# Patient Record
Sex: FEMALE | Race: WHITE | NOT HISPANIC OR LATINO | Employment: OTHER | URBAN - METROPOLITAN AREA
[De-identification: names, ages, dates, MRNs, and addresses within clinical notes are randomized per-mention and may not be internally consistent; named-entity substitution may affect disease eponyms.]

---

## 2017-01-26 ENCOUNTER — OFFICE VISIT (OUTPATIENT)
Dept: NEUROLOGY | Facility: CLINIC | Age: 37
End: 2017-01-26

## 2017-01-26 ENCOUNTER — HOSPITAL ENCOUNTER (OUTPATIENT)
Dept: MRI IMAGING | Facility: HOSPITAL | Age: 37
Discharge: HOME | End: 2017-01-26
Attending: PSYCHIATRY & NEUROLOGY | Admitting: PSYCHIATRY & NEUROLOGY

## 2017-01-26 VITALS
HEIGHT: 66 IN | SYSTOLIC BLOOD PRESSURE: 142 MMHG | WEIGHT: 144 LBS | BODY MASS INDEX: 23.14 KG/M2 | OXYGEN SATURATION: 98 % | DIASTOLIC BLOOD PRESSURE: 82 MMHG | HEART RATE: 102 BPM

## 2017-01-26 DIAGNOSIS — G35 MULTIPLE SCLEROSIS (HCC): Primary | ICD-10-CM

## 2017-01-26 PROCEDURE — 70553 MRI BRAIN STEM W/O & W/DYE: CPT

## 2017-01-26 PROCEDURE — A9577 INJ MULTIHANCE: HCPCS | Performed by: PSYCHIATRY & NEUROLOGY

## 2017-01-26 PROCEDURE — 0 GADOBENATE DIMEGLUMINE 529 MG/ML SOLUTION: Performed by: PSYCHIATRY & NEUROLOGY

## 2017-01-26 PROCEDURE — 99214 OFFICE O/P EST MOD 30 MIN: CPT | Performed by: PSYCHIATRY & NEUROLOGY

## 2017-01-26 RX ORDER — CYCLOBENZAPRINE HCL 10 MG
TABLET ORAL
Qty: 60 TABLET | Refills: 5 | Status: SHIPPED | OUTPATIENT
Start: 2017-01-26 | End: 2017-08-01 | Stop reason: SDUPTHER

## 2017-01-26 RX ADMIN — GADOBENATE DIMEGLUMINE 12 ML: 529 INJECTION, SOLUTION INTRAVENOUS at 12:30

## 2017-01-26 NOTE — PROGRESS NOTES
Subjective:     Patient ID: Daya Cheatham is a 36 y.o. female.    History of Present Illness     35 yo woman with RRMS returns in follow up.  Last visit on 12/30/16 ordered MRI Brain and labs.  Started in SLP, OT, and PT.    MRI Brain, my review of films, moderate T2 lesion load without enhancing lesions.  Labs - NCS  NMO - negative  JCV 1/23/17 1.2     Gait remains unsteady and decreased dexterity of hands.  Cramps and spasms in legs continue at night.  Treating with GBSTEPHANY, zanaflex,     PM:    Transferred by Dr Almaguer for RRMS.  Sx started in 2007 loss of vision in right eye for several days.  2008 left hemisensory loss for two weeks in October, then in December 2008 right sided numbness.  Dx 2009 and started on Rebif but had frequent episodes of numbness from waist down.  Swtiched to Tysabri for 6 months.  Then changed to Rebif for a year.  Back on Tysabri for 6 months.  Then back to Rebif for approx 6 years.     UTI and appendicitis in 2014 with appendectomy and since has had frequent UTI's and neurogenic bladders    Progressive decrease in gait and hand dexterity on Rebif.  Reviewed Dr Almaguer' notes:    Sx of muscle spasms, back pain, urinary retention and frequent UTI's.  Currently taking Rebif after taking Tysabri twice and stopped due to JCV positive ab.  Dx 10/2007 optic neuritis.  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    Review of Systems   Constitutional: Positive for fatigue. Negative for activity change and unexpected weight change.   HENT: Negative for tinnitus and trouble swallowing.    Eyes: Positive for visual  "disturbance. Negative for photophobia.   Respiratory: Negative for apnea and choking.    Cardiovascular: Negative for leg swelling.   Endocrine: Positive for heat intolerance. Negative for cold intolerance.   Genitourinary: Positive for difficulty urinating, frequency and urgency. Negative for menstrual problem.   Musculoskeletal: Positive for gait problem. Negative for back pain.   Skin: Negative for color change.   Allergic/Immunologic: Negative for immunocompromised state.   Neurological: Positive for tremors, weakness and numbness. Negative for dizziness, seizures, syncope, facial asymmetry, speech difficulty and light-headedness.   Hematological: Negative for adenopathy. Does not bruise/bleed easily.   Psychiatric/Behavioral: Positive for decreased concentration. Negative for behavioral problems, confusion, hallucinations and sleep disturbance.        Objective:  Vitals:    01/26/17 1330   BP: 142/82   Pulse: 102   SpO2: 98%   Weight: 144 lb (65.3 kg)   Height: 66\" (167.6 cm)       Neurologic Exam     Mental Status   Oriented to person, place, and time.   Registration: recalls 3 of 3 objects. Recall at 5 minutes: recalls 3 of 3 objects. Follows 3 step commands.   Attention: normal. Concentration: normal.   Speech: speech is normal   Level of consciousness: alert  Knowledge: good and consistent with education.   Able to name object. Able to read. Able to repeat. Able to write. Normal comprehension.     Cranial Nerves     CN II   Visual fields full to confrontation.   Visual acuity: normal  Right visual field deficit: none  Left visual field deficit: none     CN III, IV, VI   Pupils are equal, round, and reactive to light.  Extraocular motions are normal.   Right pupil: Shape: regular. Reactivity: brisk. Consensual response: intact.   Left pupil: Shape: regular. Reactivity: brisk. Consensual response: intact.   Nystagmus: none   Diplopia: none  Ophthalmoparesis: none  Upgaze: normal  Downgaze: normal  Conjugate " gaze: present  Vestibulo-ocular reflex: present    CN V   Facial sensation intact.   Right corneal reflex: normal  Left corneal reflex: normal    CN VII   Right facial weakness: none  Left facial weakness: none    CN VIII   Hearing: intact    CN IX, X   Palate: symmetric  Right gag reflex: normal  Left gag reflex: normal    CN XI   Right sternocleidomastoid strength: normal  Left sternocleidomastoid strength: normal    CN XII   Tongue: not atrophic  Fasciculations: absent  Tongue deviation: none    Motor Exam   Muscle bulk: normal  Overall muscle tone: normal  Right arm tone: increased  Left arm tone: normal  Right arm pronator drift: present  Right leg tone: normal  Left leg tone: normal    Strength   Strength 5/5 throughout.   Left iliopsoas: 4/5  Left quadriceps: 4/5  Left hamstrin/5  Left glutei: 4/5  Left anterior tibial: 4/5  Left posterior tibial: 4/5  Left peroneal: 4/5  Left gastroc: 4/5    Sensory Exam   Light touch normal.   Vibration normal.   Proprioception normal.   Pinprick normal.     Gait, Coordination, and Reflexes     Gait  Gait: circumduction and spastic (left leg)    Coordination   Romberg: negative  Finger to nose coordination: normal  Heel to shin coordination: abnormal  Tandem walking coordination: abnormal    Tremor   Resting tremor: absent  Intention tremor: absent  Action tremor: absent    Reflexes   Right brachioradialis: 3+  Left brachioradialis: 3+  Right biceps: 3+  Left biceps: 3+  Right triceps: 3+  Left triceps: 3+  Right patellar: 3+  Left patellar: 3+  Right achilles: 3+  Left achilles: 3+       SEBASTIAN decreased R > L hand       Physical Exam   Constitutional: She is oriented to person, place, and time. Vital signs are normal. She appears well-developed and well-nourished.   HENT:   Head: Normocephalic and atraumatic.   Eyes: EOM and lids are normal. Pupils are equal, round, and reactive to light.   Fundoscopic exam:       The right eye shows no exudate, no hemorrhage and no  papilledema. The right eye shows venous pulsations.        The left eye shows no exudate, no hemorrhage and no papilledema. The left eye shows venous pulsations.   Neck: Normal range of motion and phonation normal. Normal carotid pulses present. Carotid bruit is not present. No thyroid mass and no thyromegaly present.   Cardiovascular: Normal rate, regular rhythm and normal heart sounds.    Pulmonary/Chest: Effort normal.   Neurological: She is oriented to person, place, and time. She has normal strength. She has an abnormal Heel to Shin Test and an abnormal Tandem Gait Test. She has a normal Finger-Nose-Finger Test and a normal Romberg Test.   Reflex Scores:       Tricep reflexes are 3+ on the right side and 3+ on the left side.       Bicep reflexes are 3+ on the right side and 3+ on the left side.       Brachioradialis reflexes are 3+ on the right side and 3+ on the left side.       Patellar reflexes are 3+ on the right side and 3+ on the left side.       Achilles reflexes are 3+ on the right side and 3+ on the left side.  Skin: Skin is warm and dry.   Psychiatric: She has a normal mood and affect. Her speech is normal.   Nursing note and vitals reviewed.      Assessment/Plan:       Problems Addressed this Visit        Nervous and Auditory    Multiple sclerosis - Primary     Progression of disability and lesion load on Rebif  JCV 1.2    Start on Tecfidera 240 mg BID

## 2017-01-26 NOTE — MR AVS SNAPSHOT
Daya G Key   1/26/2017 2:30 PM   Office Visit    Dept Phone:  [phone]   Encounter #:  [id number]    Provider:  Stefano Panda MD   Department:  [facility] NEUROLOGY                Your Full Care Plan              Today's Medication Changes          These changes are accurate as of: 1/26/17  2:20 PM.  If you have any questions, ask your nurse or doctor.               Stop taking medication(s)listed here:     REBIF 44 MCG/0.5ML injection   Generic drug:  interferon beta-1A   Stopped by:  Stefano Panda MD                Where to Get Your Medications      These medications were sent to [address] [phone] [place] [id number] [address] [address]     Phone:  [phone]     cyclobenzaprine 10 MG tablet                  Your Updated Medication List          This list is accurate as of: 1/26/17  2:20 PM.  Always use your most recent med list.                baclofen 10 MG tablet   Commonly known as:  LIORESAL       clonazePAM 0.5 MG tablet   Commonly known as:  KlonoPIN       cyclobenzaprine 10 MG tablet   Commonly known as:  FLEXERIL   Take 1 to 2 tablets by mouth at bedtime.       gabapentin 600 MG tablet   Commonly known as:  NEURONTIN       JULEBER 0.15-30 MG-MCG per tablet   Generic drug:  desogestrel-ethinyl estradiol       tiZANidine 4 MG tablet   Commonly known as:  ZANAFLEX               You Were Diagnosed With        Codes Comments    Multiple sclerosis    -  Primary ICD-10-CM: G35  ICD-9-CM: 340       Instructions     None    Patient Instructions History      Upcoming Appointments     Visit Type Date Time Department    FOLLOW UP 1/26/2017  2:30 PM [facility]    [facility] BRAIN W WO CONTRAST 1/26/2017  1:00 PM [facility] MRI [facility]      MyChart Signup     [facility] allows you to send messages to your doctor, view your test results, renew your  "prescriptions, schedule appointments, and more. To sign up, go to [employer] and click on the Sign Up Now link in the New User? box. Enter your [employer] Activation Code exactly as it appears below along with the last four digits of your Social Security Number and your Date of Birth () to complete the sign-up process. If you do not sign up before the expiration date, you must request a new code.    [employer] Activation Code: [email or web address]  Expires: 2017  2:20 PM    If you have questions, you can email [email or web address] or call [phone] to talk to our [employer] staff. Remember, [employer] is NOT to be used for urgent needs. For medical emergencies, dial 911.               Other Info from Your Visit           Your Appointments     2017  2:30 PM EST   Follow Up with Stefano Panda MD   [facility] (--)    [address] [place] [address] [address]  [facility] [address] [phone]           Arrive 15 minutes prior to appointment.              Allergies     No Known Allergies      Reason for Visit     Multiple Sclerosis F/U on MRI      Vital Signs     Blood Pressure Pulse Height Weight Last Menstrual Period Oxygen Saturation    142/82 102 66\" (167.6 cm) 144 lb (65.3 kg) 12/15/2016 (Exact Date) 98%    Body Mass Index Smoking Status                23.24 kg/m2 Current Every Day Smoker          Problems and Diagnoses Noted     Multiple sclerosis        "

## 2017-01-26 NOTE — LETTER
January 26, 2017     Kristen Frausto MD  [address]  [facility] [address]    Patient: Daya Cheatham   YOB: 1980   Date of Visit: 1/26/2017       Dear Dr. Jett MD:    Thank you for referring Daya Cheatham to me for evaluation. Below are the relevant portions of my assessment and plan of care.         Problems Addressed this Visit        Nervous and Auditory    Multiple sclerosis - Primary     Progression of disability and lesion load on Rebif  JCV 1.2    Start on Tecfidera 240 mg BID                      If you have questions, please do not hesitate to call me. I look forward to following Daya along with you.         Sincerely,        Stefano Panda MD        CC: No Recipients

## 2017-04-03 RX ORDER — TIZANIDINE 4 MG/1
TABLET ORAL
Qty: 60 TABLET | Refills: 10 | Status: SHIPPED | OUTPATIENT
Start: 2017-04-03 | End: 2018-02-12 | Stop reason: SDUPTHER

## 2017-07-24 ENCOUNTER — TELEPHONE (OUTPATIENT)
Dept: NEUROLOGY | Facility: CLINIC | Age: 37
End: 2017-07-24

## 2017-07-24 RX ORDER — CYCLOBENZAPRINE HCL 10 MG
TABLET ORAL
Qty: 60 TABLET | Refills: 5 | OUTPATIENT
Start: 2017-07-24

## 2017-07-24 NOTE — TELEPHONE ENCOUNTER
Pharmacy called and adv that pt adv that she wanted to start getting gabapentin from Dr. Panda  Because she is no longer seeing primary drGlenroy That prescribes medicine. Adv pharm. That we was not able to fill due to pt not being seen at office since Dec. And was a no show in June. Adv that patient would need to s/u appt with  Before medicine could be filled.

## 2017-08-02 RX ORDER — CYCLOBENZAPRINE HCL 10 MG
TABLET ORAL
Qty: 60 TABLET | Refills: 5 | Status: SHIPPED | OUTPATIENT
Start: 2017-08-02 | End: 2018-02-01 | Stop reason: SDUPTHER

## 2017-09-20 ENCOUNTER — TELEPHONE (OUTPATIENT)
Dept: NEUROLOGY | Facility: CLINIC | Age: 37
End: 2017-09-20

## 2017-09-20 ENCOUNTER — LAB (OUTPATIENT)
Dept: LAB | Facility: HOSPITAL | Age: 37
End: 2017-09-20

## 2017-09-20 ENCOUNTER — OFFICE VISIT (OUTPATIENT)
Dept: NEUROLOGY | Facility: CLINIC | Age: 37
End: 2017-09-20

## 2017-09-20 VITALS
OXYGEN SATURATION: 98 % | WEIGHT: 133 LBS | HEART RATE: 97 BPM | HEIGHT: 66 IN | BODY MASS INDEX: 21.38 KG/M2 | SYSTOLIC BLOOD PRESSURE: 124 MMHG | DIASTOLIC BLOOD PRESSURE: 80 MMHG

## 2017-09-20 DIAGNOSIS — G35 MULTIPLE SCLEROSIS (HCC): Primary | ICD-10-CM

## 2017-09-20 DIAGNOSIS — G35 MULTIPLE SCLEROSIS (HCC): ICD-10-CM

## 2017-09-20 LAB
ALBUMIN SERPL-MCNC: 4.3 G/DL (ref 3.2–4.8)
ALBUMIN/GLOB SERPL: 1.4 G/DL (ref 1.5–2.5)
ALP SERPL-CCNC: 52 U/L (ref 25–100)
ALT SERPL W P-5'-P-CCNC: 13 U/L (ref 7–40)
ANION GAP SERPL CALCULATED.3IONS-SCNC: 3 MMOL/L (ref 3–11)
AST SERPL-CCNC: 11 U/L (ref 0–33)
BASOPHILS # BLD AUTO: 0.04 10*3/MM3 (ref 0–0.2)
BASOPHILS NFR BLD AUTO: 0.5 % (ref 0–1)
BILIRUB SERPL-MCNC: 0.3 MG/DL (ref 0.3–1.2)
BUN BLD-MCNC: 11 MG/DL (ref 9–23)
BUN/CREAT SERPL: 22 (ref 7–25)
CALCIUM SPEC-SCNC: 9.1 MG/DL (ref 8.7–10.4)
CHLORIDE SERPL-SCNC: 113 MMOL/L (ref 99–109)
CO2 SERPL-SCNC: 23 MMOL/L (ref 20–31)
CREAT BLD-MCNC: 0.5 MG/DL (ref 0.6–1.3)
DEPRECATED RDW RBC AUTO: 43.3 FL (ref 37–54)
EOSINOPHIL # BLD AUTO: 0.09 10*3/MM3 (ref 0–0.3)
EOSINOPHIL NFR BLD AUTO: 1.2 % (ref 0–3)
ERYTHROCYTE [DISTWIDTH] IN BLOOD BY AUTOMATED COUNT: 13.5 % (ref 11.3–14.5)
GFR SERPL CREATININE-BSD FRML MDRD: 137 ML/MIN/1.73
GLOBULIN UR ELPH-MCNC: 3.1 GM/DL
GLUCOSE BLD-MCNC: 81 MG/DL (ref 70–100)
HCT VFR BLD AUTO: 40.9 % (ref 34.5–44)
HGB BLD-MCNC: 13.8 G/DL (ref 11.5–15.5)
IMM GRANULOCYTES # BLD: 0.02 10*3/MM3 (ref 0–0.03)
IMM GRANULOCYTES NFR BLD: 0.3 % (ref 0–0.6)
LYMPHOCYTES # BLD AUTO: 2.22 10*3/MM3 (ref 0.6–4.8)
LYMPHOCYTES NFR BLD AUTO: 29.8 % (ref 24–44)
MCH RBC QN AUTO: 29.4 PG (ref 27–31)
MCHC RBC AUTO-ENTMCNC: 33.7 G/DL (ref 32–36)
MCV RBC AUTO: 87 FL (ref 80–99)
MONOCYTES # BLD AUTO: 0.34 10*3/MM3 (ref 0–1)
MONOCYTES NFR BLD AUTO: 4.6 % (ref 0–12)
NEUTROPHILS # BLD AUTO: 4.74 10*3/MM3 (ref 1.5–8.3)
NEUTROPHILS NFR BLD AUTO: 63.6 % (ref 41–71)
PLATELET # BLD AUTO: 220 10*3/MM3 (ref 150–450)
PMV BLD AUTO: 9.9 FL (ref 6–12)
POTASSIUM BLD-SCNC: 4.3 MMOL/L (ref 3.5–5.5)
PROT SERPL-MCNC: 7.4 G/DL (ref 5.7–8.2)
RBC # BLD AUTO: 4.7 10*6/MM3 (ref 3.89–5.14)
SODIUM BLD-SCNC: 139 MMOL/L (ref 132–146)
WBC NRBC COR # BLD: 7.45 10*3/MM3 (ref 3.5–10.8)

## 2017-09-20 PROCEDURE — 85025 COMPLETE CBC W/AUTO DIFF WBC: CPT | Performed by: PSYCHIATRY & NEUROLOGY

## 2017-09-20 PROCEDURE — 80053 COMPREHEN METABOLIC PANEL: CPT | Performed by: PSYCHIATRY & NEUROLOGY

## 2017-09-20 PROCEDURE — 99213 OFFICE O/P EST LOW 20 MIN: CPT | Performed by: PSYCHIATRY & NEUROLOGY

## 2017-09-20 PROCEDURE — 36415 COLL VENOUS BLD VENIPUNCTURE: CPT

## 2017-09-20 RX ORDER — MEDROXYPROGESTERONE ACETATE 10 MG/1
TABLET ORAL
COMMUNITY
Start: 2017-09-07 | End: 2017-09-20

## 2017-09-20 RX ORDER — GABAPENTIN 600 MG/1
600 TABLET ORAL 3 TIMES DAILY
Qty: 90 TABLET | Refills: 3 | Status: SHIPPED | OUTPATIENT
Start: 2017-09-20 | End: 2017-09-20 | Stop reason: SDUPTHER

## 2017-09-20 RX ORDER — GABAPENTIN 600 MG/1
600 TABLET ORAL 3 TIMES DAILY
Qty: 90 TABLET | Refills: 3 | Status: SHIPPED | OUTPATIENT
Start: 2017-09-20 | End: 2018-01-12 | Stop reason: SDUPTHER

## 2017-09-20 RX ORDER — DIMETHYL FUMARATE 240 MG/1
CAPSULE ORAL
COMMUNITY
Start: 2017-07-31 | End: 2019-01-07

## 2017-09-20 NOTE — PROGRESS NOTES
Subjective:     Patient ID: Daya Ashford is a 37 y.o. female.    History of Present Illness     37 y.o.  woman with RRMS returns in follow up.  Last visit on 1/26/17 started Tecfidera.    MRI Brain, 1/26/17, moderate T2 lesion load without enhancing lesions.  Labs - NCS  NMO - negative  JCV 1/23/17 1.2     Started on Tecfidera and has mild side effects of hot flashes and itching when first starting but now no longer having side effects.  Started PT/SLP but did not finish.    Does not like that she is forgetting things.  Gait unsteady on steps.       Cramps and spasms in legs continue at night.  Treating with GBP, zanaflex, and baclofen.     PMH:    Transferred by Dr Almaguer for RRMS.  Sx started in 2007 loss of vision in right eye for several days.  2008 left hemisensory loss for two weeks in October, then in December 2008 right sided numbness.  Dx 2009 and started on Rebif but had frequent episodes of numbness from waist down.  Swtiched to Tysabri for 6 months.  Then changed to Rebif for a year.  Back on Tysabri for 6 months.  Then back to Rebif for approx 6 years.     UTI and appendicitis in 2014 with appendectomy and since has had frequent UTI's and neurogenic bladders    Progressive decrease in gait and hand dexterity on Rebif.  Reviewed Dr Almaguer' notes:    Sx of muscle spasms, back pain, urinary retention and frequent UTI's.  Currently taking Rebif after taking Tysabri twice and stopped due to JCV positive ab.  Dx 10/2007 optic neuritis.  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    Review of Systems   Constitutional:  "Positive for fatigue. Negative for activity change and unexpected weight change.   HENT: Negative for tinnitus and trouble swallowing.    Eyes: Positive for visual disturbance. Negative for photophobia.   Respiratory: Negative for apnea and choking.    Cardiovascular: Negative for leg swelling.   Endocrine: Positive for heat intolerance. Negative for cold intolerance.   Genitourinary: Positive for difficulty urinating, frequency and urgency. Negative for menstrual problem.   Musculoskeletal: Positive for gait problem. Negative for back pain.   Skin: Negative for color change.   Allergic/Immunologic: Negative for immunocompromised state.   Neurological: Positive for tremors. Negative for dizziness, seizures, syncope, facial asymmetry, speech difficulty and light-headedness.   Hematological: Negative for adenopathy. Does not bruise/bleed easily.   Psychiatric/Behavioral: Positive for decreased concentration. Negative for behavioral problems, confusion, hallucinations and sleep disturbance.        Objective:  Vitals:    09/20/17 1018   BP: 124/80   Pulse: 97   SpO2: 98%   Weight: 133 lb (60.3 kg)   Height: 66\" (167.6 cm)       Neurologic Exam     Mental Status   Registration: recalls 3 of 3 objects. Recall at 5 minutes: recalls 3 of 3 objects. Follows 3 step commands.   Attention: normal. Concentration: normal.   Level of consciousness: alert  Knowledge: good and consistent with education.   Able to name object. Able to read. Able to repeat. Able to write. Normal comprehension.     Cranial Nerves     CN II   Visual fields full to confrontation.   Visual acuity: normal  Right visual field deficit: none  Left visual field deficit: none     CN III, IV, VI   Right pupil: Shape: regular. Reactivity: brisk. Consensual response: intact.   Left pupil: Shape: regular. Reactivity: brisk. Consensual response: intact.   Nystagmus: none   Diplopia: none  Ophthalmoparesis: none  Upgaze: normal  Downgaze: normal  Conjugate gaze: " present  Vestibulo-ocular reflex: present    CN V   Facial sensation intact.   Right corneal reflex: normal  Left corneal reflex: normal    CN VII   Right facial weakness: none  Left facial weakness: none    CN VIII   Hearing: intact    CN IX, X   Palate: symmetric  Right gag reflex: normal  Left gag reflex: normal    CN XI   Right sternocleidomastoid strength: normal  Left sternocleidomastoid strength: normal    CN XII   Tongue: not atrophic  Fasciculations: absent  Tongue deviation: none    Motor Exam   Muscle bulk: normal  Overall muscle tone: normal  Right arm tone: increased  Left arm tone: normal  Right arm pronator drift: present  Right leg tone: normal  Left leg tone: normal    Strength   Left iliopsoas: 4/5  Left quadriceps: 4/5  Left hamstrin/5  Left glutei: 4/5  Left anterior tibial: 4/5  Left posterior tibial: 4/5  Left peroneal: 4/5  Left gastroc: 4/5    Sensory Exam   Light touch normal.   Vibration normal.   Proprioception normal.   Pinprick normal.     Gait, Coordination, and Reflexes     Gait  Gait: circumduction, spastic and wide-based (left leg)    Tremor   Resting tremor: absent  Intention tremor: absent  Action tremor: absent    Reflexes   Right brachioradialis: 3+  Left brachioradialis: 3+  Right biceps: 3+  Left biceps: 3+  Right triceps: 3+  Left triceps: 3+  Right patellar: 3+  Left patellar: 3+  Right achilles: 3+  Left achilles: 3+               Physical Exam   Constitutional: She appears well-developed and well-nourished.   Neurological:   Reflex Scores:       Tricep reflexes are 3+ on the right side and 3+ on the left side.       Bicep reflexes are 3+ on the right side and 3+ on the left side.       Brachioradialis reflexes are 3+ on the right side and 3+ on the left side.       Patellar reflexes are 3+ on the right side and 3+ on the left side.       Achilles reflexes are 3+ on the right side and 3+ on the left side.  Nursing note and vitals reviewed.      Assessment/Plan:        Problems Addressed this Visit        Nervous and Auditory    Multiple sclerosis - Primary     Sx stable and tolerating Tecfidera    CBC, CMP    Continue GBP, baclofen and tizanidine         Relevant Orders    CBC & Differential    Comprehensive Metabolic Panel

## 2017-10-31 ENCOUNTER — TELEPHONE (OUTPATIENT)
Dept: NEUROLOGY | Facility: CLINIC | Age: 37
End: 2017-10-31

## 2017-10-31 RX ORDER — BACLOFEN 10 MG/1
10 TABLET ORAL 3 TIMES DAILY
Qty: 90 TABLET | Refills: 11 | Status: SHIPPED | OUTPATIENT
Start: 2017-10-31 | End: 2018-03-20 | Stop reason: SDUPTHER

## 2017-10-31 NOTE — TELEPHONE ENCOUNTER
----- Message from Patty Salcedo sent at 10/31/2017  9:43 AM EDT -----  Regarding: REFILL  Contact: [phone]  Patient request baclofen 10 mg    [facility] [place]

## 2018-01-12 RX ORDER — GABAPENTIN 600 MG/1
TABLET ORAL
Qty: 90 TABLET | Refills: 3 | Status: SHIPPED | OUTPATIENT
Start: 2018-01-12 | End: 2018-05-12 | Stop reason: SDUPTHER

## 2018-02-01 RX ORDER — CYCLOBENZAPRINE HCL 10 MG
TABLET ORAL
Qty: 60 TABLET | Refills: 5 | Status: SHIPPED | OUTPATIENT
Start: 2018-02-01 | End: 2018-08-04 | Stop reason: SDUPTHER

## 2018-02-12 RX ORDER — TIZANIDINE 4 MG/1
TABLET ORAL
Qty: 60 TABLET | Refills: 1 | Status: SHIPPED | OUTPATIENT
Start: 2018-02-12 | End: 2018-03-20 | Stop reason: SDUPTHER

## 2018-03-20 ENCOUNTER — OFFICE VISIT (OUTPATIENT)
Dept: NEUROLOGY | Facility: CLINIC | Age: 38
End: 2018-03-20

## 2018-03-20 VITALS
RESPIRATION RATE: 16 BRPM | HEART RATE: 108 BPM | HEIGHT: 66 IN | BODY MASS INDEX: 23.3 KG/M2 | SYSTOLIC BLOOD PRESSURE: 124 MMHG | DIASTOLIC BLOOD PRESSURE: 82 MMHG | OXYGEN SATURATION: 97 % | WEIGHT: 145 LBS

## 2018-03-20 DIAGNOSIS — G35 MULTIPLE SCLEROSIS (HCC): Primary | ICD-10-CM

## 2018-03-20 DIAGNOSIS — R25.2 SPASTIC: ICD-10-CM

## 2018-03-20 PROCEDURE — 99213 OFFICE O/P EST LOW 20 MIN: CPT | Performed by: PSYCHIATRY & NEUROLOGY

## 2018-03-20 RX ORDER — TIZANIDINE 4 MG/1
8 TABLET ORAL NIGHTLY
Qty: 60 TABLET | Refills: 11 | Status: SHIPPED | OUTPATIENT
Start: 2018-03-20 | End: 2019-04-02 | Stop reason: SDUPTHER

## 2018-03-20 RX ORDER — BACLOFEN 10 MG/1
10 TABLET ORAL 3 TIMES DAILY
Qty: 90 TABLET | Refills: 11 | Status: SHIPPED | OUTPATIENT
Start: 2018-03-20 | End: 2019-03-16 | Stop reason: SDUPTHER

## 2018-03-20 RX ORDER — ALBUTEROL SULFATE 90 UG/1
AEROSOL, METERED RESPIRATORY (INHALATION)
Refills: 0 | COMMUNITY
Start: 2018-01-24 | End: 2019-05-14

## 2018-03-20 NOTE — PROGRESS NOTES
Subjective:   Chief Complaint   Patient presents with   • Follow-up     MS       Patient ID: Daya Ashford is a 37 y.o. female.    History of Present Illness     37 y.o.  woman with RRMS returns in follow up.  Last visit on 9/20/17 continued Tecfidera and ordered CBC,CMP.    CBC,CMP -  NCS 9/20/17    MRI Brain, 1/26/17, moderate T2 lesion load without enhancing lesions.     JCV 1/23/17 1.2     Notes muscle spasm in legs at night.  Walking around helps sx.  Taking GBP, baclofen and zanaflex decreases pain in legs.      Denies side effects of Tecfidera.  Reports fingers feel weaker.  Balance is off and gait is unsteady.  Cognition is declining.  Bladder urgency and frequency.    PMH:    Transferred by Dr Almaguer for RRMS.  Sx started in 2007 loss of vision in right eye for several days.  2008 left hemisensory loss for two weeks in October, then in December 2008 right sided numbness.  Dx 2009 and started on Rebif but had frequent episodes of numbness from waist down.  Swtiched to Tysabri for 6 months.  Then changed to Rebif for a year.  Back on Tysabri for 6 months.  Then back to Rebif for approx 6 years.     UTI and appendicitis in 2014 with appendectomy and since has had frequent UTI's and neurogenic bladders    Progressive decrease in gait and hand dexterity on Rebif.  Reviewed Dr Almaguer' notes:    Sx of muscle spasms, back pain, urinary retention and frequent UTI's.  Currently taking Rebif after taking Tysabri twice and stopped due to JCV positive ab.  Dx 10/2007 optic neuritis.  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    Review of Systems   Constitutional: Positive for fatigue. Negative for activity change and unexpected weight change.   HENT: Negative for tinnitus and trouble swallowing.    Eyes: Positive for visual disturbance. Negative for photophobia.   Respiratory: Negative for apnea and choking.    Cardiovascular: Negative for leg swelling.   Endocrine: Positive for heat intolerance. Negative for cold intolerance.   Genitourinary: Positive for difficulty urinating, frequency and urgency. Negative for menstrual problem.   Musculoskeletal: Positive for gait problem. Negative for back pain.   Skin: Negative for color change.   Allergic/Immunologic: Negative for immunocompromised state.   Neurological: Positive for tremors. Negative for dizziness, seizures, syncope, facial asymmetry, speech difficulty and light-headedness.   Hematological: Negative for adenopathy. Does not bruise/bleed easily.   Psychiatric/Behavioral: Positive for decreased concentration. Negative for behavioral problems, confusion, hallucinations and sleep disturbance.        Objective:  Vitals:    03/20/18 1037   BP: 124/82   Pulse: 108   Resp: 16   SpO2: 97%   Weight: 65.8 kg (145 lb)   Height: 167.6 cm (66\")       Neurologic Exam     Mental Status   Registration: recalls 3 of 3 objects. Recall at 5 minutes: recalls 3 of 3 objects. Follows 3 step commands.   Attention: normal. Concentration: normal.   Level of consciousness: alert  Knowledge: good and consistent with education.   Able to name object. Able to read. Able to repeat. Able to write. Normal comprehension.     Cranial Nerves     CN II   Visual fields full to confrontation.   Visual acuity: normal  Right visual field deficit: none  Left visual field deficit: none     CN III, IV, VI   Right pupil: Shape: regular. Reactivity: brisk. Consensual response: intact.   Left pupil: Shape: regular. Reactivity: brisk. Consensual response: intact.   Nystagmus: none   Diplopia: none  Ophthalmoparesis: " none  Upgaze: normal  Downgaze: normal  Conjugate gaze: present  Vestibulo-ocular reflex: present    CN V   Facial sensation intact.   Right corneal reflex: normal  Left corneal reflex: normal    CN VII   Right facial weakness: none  Left facial weakness: none    CN VIII   Hearing: intact    CN IX, X   Palate: symmetric  Right gag reflex: normal  Left gag reflex: normal    CN XI   Right sternocleidomastoid strength: normal  Left sternocleidomastoid strength: normal    CN XII   Tongue: not atrophic  Fasciculations: absent  Tongue deviation: none    Motor Exam   Muscle bulk: normal  Overall muscle tone: normal  Right arm tone: increased  Left arm tone: normal  Right arm pronator drift: present  Right leg tone: normal  Left leg tone: normal    Strength   Left iliopsoas: 4/5  Left quadriceps: 4/5  Left hamstrin/5  Left glutei: 4/5  Left anterior tibial: 4/5  Left posterior tibial: 4/5  Left peroneal: 4/5  Left gastroc: 4/5    Sensory Exam   Light touch normal.   Vibration normal.   Proprioception normal.   Pinprick normal.     Gait, Coordination, and Reflexes     Gait  Gait: circumduction, spastic and wide-based (left leg)    Tremor   Resting tremor: absent  Intention tremor: absent  Action tremor: absent    Reflexes   Right brachioradialis: 3+  Left brachioradialis: 3+  Right biceps: 3+  Left biceps: 3+  Right triceps: 3+  Left triceps: 3+  Right patellar: 3+  Left patellar: 3+  Right achilles: 3+  Left achilles: 3+        Physical Exam   Constitutional: She appears well-developed and well-nourished.   Neurological:   Reflex Scores:       Tricep reflexes are 3+ on the right side and 3+ on the left side.       Bicep reflexes are 3+ on the right side and 3+ on the left side.       Brachioradialis reflexes are 3+ on the right side and 3+ on the left side.       Patellar reflexes are 3+ on the right side and 3+ on the left side.       Achilles reflexes are 3+ on the right side and 3+ on the left side.  Nursing note and  vitals reviewed.      Assessment/Plan:       Problems Addressed this Visit        Nervous and Auditory    Multiple sclerosis - Primary     Tolerating Tecfidera   Due for MRI Brain and labs     Continue GBP, baclofen and zanaflex          Relevant Orders    MRI Brain With & Without Contrast    CBC & Differential    Comprehensive Metabolic Panel       Other    Spastic     Continue GBP, zanaflex, baclofen            Other Visit Diagnoses    None.

## 2018-05-14 ENCOUNTER — TELEPHONE (OUTPATIENT)
Dept: NEUROLOGY | Facility: CLINIC | Age: 38
End: 2018-05-14

## 2018-05-14 RX ORDER — GABAPENTIN 600 MG/1
TABLET ORAL
Qty: 90 TABLET | Refills: 3 | Status: SHIPPED | OUTPATIENT
Start: 2018-05-14 | End: 2018-09-03 | Stop reason: SDUPTHER

## 2018-05-14 NOTE — TELEPHONE ENCOUNTER
Please advise if it ok to refill the medication for the patient. Patient has a follow up appointment with us 9/2018. Thanks!!

## 2018-05-17 ENCOUNTER — TELEPHONE (OUTPATIENT)
Dept: NEUROLOGY | Facility: CLINIC | Age: 38
End: 2018-05-17

## 2018-06-04 NOTE — TELEPHONE ENCOUNTER
Called the patient, no answer, left message advising the patient that if she wanted an update on MRI that the provider would need both disc to compare. Instructed the patient that she could mail it to us.

## 2018-08-06 RX ORDER — CYCLOBENZAPRINE HCL 10 MG
TABLET ORAL
Qty: 60 TABLET | Refills: 5 | Status: SHIPPED | OUTPATIENT
Start: 2018-08-06 | End: 2019-02-01 | Stop reason: SDUPTHER

## 2018-09-05 RX ORDER — GABAPENTIN 600 MG/1
TABLET ORAL
Qty: 90 TABLET | Refills: 3 | Status: SHIPPED | OUTPATIENT
Start: 2018-09-05 | End: 2019-01-07 | Stop reason: SDUPTHER

## 2019-01-07 ENCOUNTER — SPECIALTY PHARMACY (OUTPATIENT)
Dept: ONCOLOGY | Facility: HOSPITAL | Age: 39
End: 2019-01-07

## 2019-01-07 ENCOUNTER — OFFICE VISIT (OUTPATIENT)
Dept: NEUROLOGY | Facility: CLINIC | Age: 39
End: 2019-01-07

## 2019-01-07 ENCOUNTER — LAB (OUTPATIENT)
Dept: LAB | Facility: HOSPITAL | Age: 39
End: 2019-01-07

## 2019-01-07 VITALS
BODY MASS INDEX: 24.59 KG/M2 | DIASTOLIC BLOOD PRESSURE: 74 MMHG | OXYGEN SATURATION: 100 % | WEIGHT: 153 LBS | HEIGHT: 66 IN | SYSTOLIC BLOOD PRESSURE: 128 MMHG | HEART RATE: 125 BPM | RESPIRATION RATE: 20 BRPM

## 2019-01-07 DIAGNOSIS — G35 MULTIPLE SCLEROSIS (HCC): ICD-10-CM

## 2019-01-07 DIAGNOSIS — R25.2 SPASTIC: ICD-10-CM

## 2019-01-07 DIAGNOSIS — G35 MULTIPLE SCLEROSIS (HCC): Primary | ICD-10-CM

## 2019-01-07 LAB
ALBUMIN SERPL-MCNC: 4.57 G/DL (ref 3.2–4.8)
ALBUMIN/GLOB SERPL: 2 G/DL (ref 1.5–2.5)
ALP SERPL-CCNC: 60 U/L (ref 25–100)
ALT SERPL W P-5'-P-CCNC: 18 U/L (ref 7–40)
ANION GAP SERPL CALCULATED.3IONS-SCNC: 7 MMOL/L (ref 3–11)
AST SERPL-CCNC: 14 U/L (ref 0–33)
BASOPHILS # BLD AUTO: 0.04 10*3/MM3 (ref 0–0.2)
BASOPHILS NFR BLD AUTO: 0.3 % (ref 0–1)
BILIRUB SERPL-MCNC: 0.5 MG/DL (ref 0.3–1.2)
BUN BLD-MCNC: 14 MG/DL (ref 9–23)
BUN/CREAT SERPL: 23.7 (ref 7–25)
CALCIUM SPEC-SCNC: 9.3 MG/DL (ref 8.7–10.4)
CHLORIDE SERPL-SCNC: 108 MMOL/L (ref 99–109)
CO2 SERPL-SCNC: 25 MMOL/L (ref 20–31)
CREAT BLD-MCNC: 0.59 MG/DL (ref 0.6–1.3)
DEPRECATED RDW RBC AUTO: 44.6 FL (ref 37–54)
EOSINOPHIL # BLD AUTO: 0.1 10*3/MM3 (ref 0–0.3)
EOSINOPHIL NFR BLD AUTO: 0.6 % (ref 0–3)
ERYTHROCYTE [DISTWIDTH] IN BLOOD BY AUTOMATED COUNT: 13.9 % (ref 11.3–14.5)
GFR SERPL CREATININE-BSD FRML MDRD: 113 ML/MIN/1.73
GLOBULIN UR ELPH-MCNC: 2.2 GM/DL
GLUCOSE BLD-MCNC: 96 MG/DL (ref 70–100)
HCT VFR BLD AUTO: 43.8 % (ref 34.5–44)
HGB BLD-MCNC: 14.3 G/DL (ref 11.5–15.5)
IMM GRANULOCYTES # BLD AUTO: 0.06 10*3/MM3 (ref 0–0.03)
IMM GRANULOCYTES NFR BLD AUTO: 0.4 % (ref 0–0.6)
LYMPHOCYTES # BLD AUTO: 1.65 10*3/MM3 (ref 0.6–4.8)
LYMPHOCYTES NFR BLD AUTO: 10.7 % (ref 24–44)
MCH RBC QN AUTO: 28.7 PG (ref 27–31)
MCHC RBC AUTO-ENTMCNC: 32.6 G/DL (ref 32–36)
MCV RBC AUTO: 87.8 FL (ref 80–99)
MONOCYTES # BLD AUTO: 0.97 10*3/MM3 (ref 0–1)
MONOCYTES NFR BLD AUTO: 6.3 % (ref 0–12)
NEUTROPHILS # BLD AUTO: 12.64 10*3/MM3 (ref 1.5–8.3)
NEUTROPHILS NFR BLD AUTO: 82.1 % (ref 41–71)
PLATELET # BLD AUTO: 228 10*3/MM3 (ref 150–450)
PMV BLD AUTO: 9.7 FL (ref 6–12)
POTASSIUM BLD-SCNC: 4 MMOL/L (ref 3.5–5.5)
PROT SERPL-MCNC: 6.8 G/DL (ref 5.7–8.2)
RBC # BLD AUTO: 4.99 10*6/MM3 (ref 3.89–5.14)
SODIUM BLD-SCNC: 140 MMOL/L (ref 132–146)
WBC NRBC COR # BLD: 15.4 10*3/MM3 (ref 3.5–10.8)

## 2019-01-07 PROCEDURE — 36415 COLL VENOUS BLD VENIPUNCTURE: CPT

## 2019-01-07 PROCEDURE — 85025 COMPLETE CBC W/AUTO DIFF WBC: CPT

## 2019-01-07 PROCEDURE — 85007 BL SMEAR W/DIFF WBC COUNT: CPT

## 2019-01-07 PROCEDURE — 99214 OFFICE O/P EST MOD 30 MIN: CPT | Performed by: PSYCHIATRY & NEUROLOGY

## 2019-01-07 PROCEDURE — 80053 COMPREHEN METABOLIC PANEL: CPT

## 2019-01-07 RX ORDER — DIMETHYL FUMARATE 240 MG/1
240 CAPSULE ORAL 2 TIMES DAILY
Qty: 60 CAPSULE | Refills: 11 | Status: SHIPPED | OUTPATIENT
Start: 2019-01-07 | End: 2020-01-07 | Stop reason: SDUPTHER

## 2019-01-07 RX ORDER — GABAPENTIN 600 MG/1
600 TABLET ORAL 3 TIMES DAILY
Qty: 90 TABLET | Refills: 5 | Status: SHIPPED | OUTPATIENT
Start: 2019-01-07 | End: 2019-01-08 | Stop reason: SDUPTHER

## 2019-01-07 NOTE — PROGRESS NOTES
Subjective:   Chief Complaint   Patient presents with   • Multiple Sclerosis       Patient ID: Daya Ashford is a 38 y.o. female.    History of Present Illness     38 y.o.  woman with RRMS returns in follow up.  Last visit on 3/20/18 continued Tecfidera and ordered MRI Brain, CBC,CMP.    CBC,CMP -  NCS 4/4/18    MRI Brain, 3/20/18, moderate T2 lesion load without enhancing lesions.     JCV 1/23/17 1.2     Compliant with Tecfidera and tolerating without difficulty.      Legs and hands will spasm at random times.  GBP decreases back and leg pain.  Sharp shooting pains.   Gait is unsteady but no falls.       Bladder urgency and frequency continues.      Family has CMT.     PMH:    Transferred by Dr Almaguer for RRMS.  Sx started in 2007 loss of vision in right eye for several days.  2008 left hemisensory loss for two weeks in October, then in December 2008 right sided numbness.  Dx 2009 and started on Rebif but had frequent episodes of numbness from waist down.  Swtiched to Tysabri for 6 months.  Then changed to Rebif for a year.  Back on Tysabri for 6 months.  Then back to Rebif for approx 6 years.     UTI and appendicitis in 2014 with appendectomy and since has had frequent UTI's and neurogenic bladders    Progressive decrease in gait and hand dexterity on Rebif.    Reviewed Dr Almaguer' notes:    Sx of muscle spasms, back pain, urinary retention and frequent UTI's.  Currently taking Rebif after taking Tysabri twice and stopped due to JCV positive ab.  Dx 10/2007 optic neuritis.  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    Review  "of Systems   Constitutional: Positive for fatigue. Negative for activity change and unexpected weight change.   HENT: Negative for tinnitus and trouble swallowing.    Eyes: Positive for visual disturbance. Negative for photophobia.   Respiratory: Negative for apnea and choking.    Cardiovascular: Negative for leg swelling.   Endocrine: Positive for heat intolerance. Negative for cold intolerance.   Genitourinary: Positive for difficulty urinating, frequency and urgency. Negative for menstrual problem.   Musculoskeletal: Positive for gait problem. Negative for back pain.   Skin: Negative for color change.   Allergic/Immunologic: Negative for immunocompromised state.   Neurological: Positive for tremors. Negative for dizziness, seizures, syncope, facial asymmetry, speech difficulty and light-headedness.   Hematological: Negative for adenopathy. Does not bruise/bleed easily.   Psychiatric/Behavioral: Positive for decreased concentration. Negative for behavioral problems, confusion, hallucinations and sleep disturbance.        Objective:  Vitals:    01/07/19 1027   BP: 128/74   Pulse: (!) 125   Resp: 20   SpO2: 100%   Weight: 69.4 kg (153 lb)   Height: 167.6 cm (66\")       Neurologic Exam     Mental Status   Registration: recalls 3 of 3 objects. Recall at 5 minutes: recalls 3 of 3 objects. Follows 3 step commands.   Attention: normal. Concentration: normal.   Level of consciousness: alert  Knowledge: good and consistent with education.   Able to name object. Able to read. Able to repeat. Able to write. Normal comprehension.     Cranial Nerves     CN II   Visual fields full to confrontation.   Visual acuity: normal  Right visual field deficit: none  Left visual field deficit: none     CN III, IV, VI   Right pupil: Shape: regular. Reactivity: brisk. Consensual response: intact.   Left pupil: Shape: regular. Reactivity: brisk. Consensual response: intact.   Nystagmus: none   Diplopia: none  Ophthalmoparesis: none  Upgaze: " normal  Downgaze: normal  Conjugate gaze: present  Vestibulo-ocular reflex: present    CN V   Facial sensation intact.   Right corneal reflex: normal  Left corneal reflex: normal    CN VII   Right facial weakness: none  Left facial weakness: none    CN VIII   Hearing: intact    CN IX, X   Palate: symmetric  Right gag reflex: normal  Left gag reflex: normal    CN XI   Right sternocleidomastoid strength: normal  Left sternocleidomastoid strength: normal    CN XII   Tongue: not atrophic  Fasciculations: absent  Tongue deviation: none    Motor Exam   Muscle bulk: normal  Overall muscle tone: normal  Right arm tone: increased  Left arm tone: normal  Right arm pronator drift: present  Right leg tone: normal  Left leg tone: normal    Strength   Left iliopsoas: 4/5  Left quadriceps: 4/5  Left hamstrin/5  Left glutei: 4/5  Left anterior tibial: 4/5  Left posterior tibial: 4/5  Left peroneal: 4/5  Left gastroc: 4/5    Sensory Exam   Light touch normal.   Vibration normal.   Proprioception normal.   Pinprick normal.     Gait, Coordination, and Reflexes     Gait  Gait: circumduction, spastic and wide-based (left leg)    Tremor   Resting tremor: absent  Intention tremor: absent  Action tremor: absent    Reflexes   Right brachioradialis: 3+  Left brachioradialis: 3+  Right biceps: 3+  Left biceps: 3+  Right triceps: 3+  Left triceps: 3+  Right patellar: 3+  Left patellar: 3+  Right achilles: 3+  Left achilles: 3+        Physical Exam   Constitutional: She appears well-developed and well-nourished.   Neurological:   Reflex Scores:       Tricep reflexes are 3+ on the right side and 3+ on the left side.       Bicep reflexes are 3+ on the right side and 3+ on the left side.       Brachioradialis reflexes are 3+ on the right side and 3+ on the left side.       Patellar reflexes are 3+ on the right side and 3+ on the left side.       Achilles reflexes are 3+ on the right side and 3+ on the left side.  Nursing note and vitals  reviewed.      Assessment/Plan:       Problems Addressed this Visit        Nervous and Auditory    Multiple sclerosis (CMS/HCC) - Primary     Sx stable on Tecfidera    MRI Brain     CBC,CMP             Relevant Orders    MRI Brain With & Without Contrast    Comprehensive Metabolic Panel    CBC & Differential       Musculoskeletal and Integument    Spastic     Sx controlled on GBP, Baclofen and Zanaflex

## 2019-01-08 RX ORDER — GABAPENTIN 600 MG/1
600 TABLET ORAL 3 TIMES DAILY
Qty: 90 TABLET | Refills: 5 | Status: SHIPPED | OUTPATIENT
Start: 2019-01-08 | End: 2019-12-09 | Stop reason: SDUPTHER

## 2019-02-01 RX ORDER — CYCLOBENZAPRINE HCL 10 MG
TABLET ORAL
Qty: 60 TABLET | Refills: 0 | Status: SHIPPED | OUTPATIENT
Start: 2019-02-01 | End: 2019-03-01 | Stop reason: SDUPTHER

## 2019-03-01 RX ORDER — CYCLOBENZAPRINE HCL 10 MG
TABLET ORAL
Qty: 60 TABLET | Refills: 0 | Status: SHIPPED | OUTPATIENT
Start: 2019-03-01 | End: 2019-04-02 | Stop reason: SDUPTHER

## 2019-03-18 RX ORDER — BACLOFEN 10 MG/1
TABLET ORAL
Qty: 90 TABLET | Refills: 0 | Status: SHIPPED | OUTPATIENT
Start: 2019-03-18 | End: 2019-04-15 | Stop reason: SDUPTHER

## 2019-04-02 RX ORDER — TIZANIDINE 4 MG/1
TABLET ORAL
Qty: 60 TABLET | Refills: 0 | Status: SHIPPED | OUTPATIENT
Start: 2019-04-02 | End: 2019-05-01 | Stop reason: SDUPTHER

## 2019-04-02 RX ORDER — CYCLOBENZAPRINE HCL 10 MG
TABLET ORAL
Qty: 60 TABLET | Refills: 0 | Status: SHIPPED | OUTPATIENT
Start: 2019-04-02 | End: 2019-05-01 | Stop reason: SDUPTHER

## 2019-04-15 RX ORDER — BACLOFEN 10 MG/1
TABLET ORAL
Qty: 90 TABLET | Refills: 0 | Status: SHIPPED | OUTPATIENT
Start: 2019-04-15 | End: 2019-05-15 | Stop reason: SDUPTHER

## 2019-05-01 RX ORDER — TIZANIDINE 4 MG/1
TABLET ORAL
Qty: 60 TABLET | Refills: 0 | Status: SHIPPED | OUTPATIENT
Start: 2019-05-01 | End: 2019-05-30 | Stop reason: SDUPTHER

## 2019-05-01 RX ORDER — CYCLOBENZAPRINE HCL 10 MG
TABLET ORAL
Qty: 60 TABLET | Refills: 0 | Status: SHIPPED | OUTPATIENT
Start: 2019-05-01 | End: 2019-05-30 | Stop reason: SDUPTHER

## 2019-05-14 ENCOUNTER — LAB (OUTPATIENT)
Dept: LAB | Facility: HOSPITAL | Age: 39
End: 2019-05-14

## 2019-05-14 ENCOUNTER — OFFICE VISIT (OUTPATIENT)
Dept: NEUROLOGY | Facility: CLINIC | Age: 39
End: 2019-05-14

## 2019-05-14 VITALS
SYSTOLIC BLOOD PRESSURE: 124 MMHG | HEART RATE: 97 BPM | HEIGHT: 66 IN | BODY MASS INDEX: 27.16 KG/M2 | WEIGHT: 169 LBS | DIASTOLIC BLOOD PRESSURE: 72 MMHG | RESPIRATION RATE: 16 BRPM | OXYGEN SATURATION: 97 %

## 2019-05-14 DIAGNOSIS — R25.2 SPASTIC: ICD-10-CM

## 2019-05-14 DIAGNOSIS — G35 MULTIPLE SCLEROSIS (HCC): ICD-10-CM

## 2019-05-14 DIAGNOSIS — M79.2 NEUROPATHIC PAIN: ICD-10-CM

## 2019-05-14 DIAGNOSIS — G35 MULTIPLE SCLEROSIS (HCC): Primary | ICD-10-CM

## 2019-05-14 LAB
ALBUMIN SERPL-MCNC: 4.3 G/DL (ref 3.5–5.2)
ALBUMIN/GLOB SERPL: 1.3 G/DL
ALP SERPL-CCNC: 63 U/L (ref 39–117)
ALT SERPL W P-5'-P-CCNC: 17 U/L (ref 1–33)
ANION GAP SERPL CALCULATED.3IONS-SCNC: 9.8 MMOL/L
AST SERPL-CCNC: 13 U/L (ref 1–32)
BASOPHILS # BLD AUTO: 0.07 10*3/MM3 (ref 0–0.2)
BASOPHILS NFR BLD AUTO: 0.8 % (ref 0–1.5)
BILIRUB SERPL-MCNC: 0.3 MG/DL (ref 0.2–1.2)
BUN BLD-MCNC: 10 MG/DL (ref 6–20)
BUN/CREAT SERPL: 15.6 (ref 7–25)
CALCIUM SPEC-SCNC: 9.4 MG/DL (ref 8.6–10.5)
CHLORIDE SERPL-SCNC: 106 MMOL/L (ref 98–107)
CO2 SERPL-SCNC: 25.2 MMOL/L (ref 22–29)
CREAT BLD-MCNC: 0.64 MG/DL (ref 0.57–1)
DEPRECATED RDW RBC AUTO: 44.5 FL (ref 37–54)
EOSINOPHIL # BLD AUTO: 0.1 10*3/MM3 (ref 0–0.4)
EOSINOPHIL NFR BLD AUTO: 1.1 % (ref 0.3–6.2)
ERYTHROCYTE [DISTWIDTH] IN BLOOD BY AUTOMATED COUNT: 13.1 % (ref 12.3–15.4)
GFR SERPL CREATININE-BSD FRML MDRD: 103 ML/MIN/1.73
GLOBULIN UR ELPH-MCNC: 3.3 GM/DL
GLUCOSE BLD-MCNC: 91 MG/DL (ref 65–99)
HCT VFR BLD AUTO: 46.4 % (ref 34–46.6)
HGB BLD-MCNC: 14.3 G/DL (ref 12–15.9)
IMM GRANULOCYTES # BLD AUTO: 0.06 10*3/MM3 (ref 0–0.05)
IMM GRANULOCYTES NFR BLD AUTO: 0.7 % (ref 0–0.5)
LYMPHOCYTES # BLD AUTO: 1.61 10*3/MM3 (ref 0.7–3.1)
LYMPHOCYTES NFR BLD AUTO: 17.6 % (ref 19.6–45.3)
MCH RBC QN AUTO: 28.1 PG (ref 26.6–33)
MCHC RBC AUTO-ENTMCNC: 30.8 G/DL (ref 31.5–35.7)
MCV RBC AUTO: 91.3 FL (ref 79–97)
MONOCYTES # BLD AUTO: 0.43 10*3/MM3 (ref 0.1–0.9)
MONOCYTES NFR BLD AUTO: 4.7 % (ref 5–12)
NEUTROPHILS # BLD AUTO: 6.9 10*3/MM3 (ref 1.7–7)
NEUTROPHILS NFR BLD AUTO: 75.1 % (ref 42.7–76)
NRBC BLD AUTO-RTO: 0 /100 WBC (ref 0–0.2)
PLATELET # BLD AUTO: 247 10*3/MM3 (ref 140–450)
PMV BLD AUTO: 10.3 FL (ref 6–12)
POTASSIUM BLD-SCNC: 4.9 MMOL/L (ref 3.5–5.2)
PROT SERPL-MCNC: 7.6 G/DL (ref 6–8.5)
RBC # BLD AUTO: 5.08 10*6/MM3 (ref 3.77–5.28)
SODIUM BLD-SCNC: 141 MMOL/L (ref 136–145)
WBC NRBC COR # BLD: 9.17 10*3/MM3 (ref 3.4–10.8)

## 2019-05-14 PROCEDURE — 85025 COMPLETE CBC W/AUTO DIFF WBC: CPT

## 2019-05-14 PROCEDURE — 80053 COMPREHEN METABOLIC PANEL: CPT

## 2019-05-14 PROCEDURE — 99214 OFFICE O/P EST MOD 30 MIN: CPT | Performed by: PSYCHIATRY & NEUROLOGY

## 2019-05-14 PROCEDURE — 36415 COLL VENOUS BLD VENIPUNCTURE: CPT

## 2019-05-14 NOTE — PROGRESS NOTES
Subjective:   Chief Complaint   Patient presents with   • Multiple Sclerosis       Patient ID: Daya Ashford is a 38 y.o. female.    History of Present Illness     38 y.o.  woman with RRMS and spasticity returns in follow up.  Last visit on 1/7/19 continued Tecfidera and ordered MRI Brain, CBC,CMP.    CBC,CMP -  NCS 1/7/19    MRI Brain, 1/7/19, moderate T2 lesion load without enhancing lesions.     JCV 1/23/17 1.2     RRMS    Compliant with Tecfidera and tolerating without difficulty.      Gait is ataxic.      Continued ST memory, attention/concentraion difficulties.     Legs and hands will spasm with cooler weather    Bladder urgency and frequency continues.      Neuropathic pain     GBP decreases back and leg pain.  Sharp shooting pains.   Gait is unsteady but no falls.          Family has CMT.     PMH:    Transferred by Dr Almaguer for RRMS.  Sx started in 2007 loss of vision in right eye for several days.  2008 left hemisensory loss for two weeks in October, then in December 2008 right sided numbness.  Dx 2009 and started on Rebif but had frequent episodes of numbness from waist down.  Swtiched to Tysabri for 6 months.  Then changed to Rebif for a year.  Back on Tysabri for 6 months.  Then back to Rebif for approx 6 years.     UTI and appendicitis in 2014 with appendectomy and since has had frequent UTI's and neurogenic bladders    Progressive decrease in gait and hand dexterity on Rebif.    Reviewed Dr Almaguer' notes:    Sx of muscle spasms, back pain, urinary retention and frequent UTI's.  Currently taking Rebif after taking Tysabri twice and stopped due to JCV positive ab.  Dx 10/2007 optic neuritis.  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    Review of Systems   Constitutional: Negative for activity change and unexpected weight change.   HENT: Negative for tinnitus and trouble swallowing.    Eyes: Positive for visual disturbance. Negative for photophobia.   Respiratory: Negative for apnea and choking.    Cardiovascular: Negative for leg swelling.   Endocrine: Positive for heat intolerance. Negative for cold intolerance.   Genitourinary: Positive for difficulty urinating, frequency and urgency. Negative for menstrual problem.   Musculoskeletal: Positive for gait problem. Negative for back pain.   Skin: Negative for color change.   Allergic/Immunologic: Negative for immunocompromised state.   Neurological: Positive for tremors. Negative for dizziness, seizures, syncope, facial asymmetry, speech difficulty and light-headedness.   Hematological: Negative for adenopathy. Does not bruise/bleed easily.   Psychiatric/Behavioral: Positive for decreased concentration. Negative for behavioral problems, confusion, hallucinations and sleep disturbance.        Objective:  Vitals:    05/14/19 0805   BP: 124/72   BP Location: Left arm   Patient Position: Sitting   Cuff Size: Adult   Pulse: 97   Resp: 16   SpO2: 97%   Weight: 76.7 kg (169 lb)   Height: 167.6 cm (66\")       Neurologic Exam     Mental Status   Registration: recalls 3 of 3 objects. Recall at 5 minutes: recalls 3 of 3 objects. Follows 3 step commands.   Attention: normal. Concentration: normal.   Level of consciousness: alert  Knowledge: good and consistent with education.   Able to name object. Able to read. Able to repeat. Able to write. Normal comprehension.     Cranial Nerves     CN II   Visual fields full to confrontation.   Visual acuity: normal  Right visual field deficit: none  Left visual field deficit: none     CN III, IV, VI   Right pupil: Shape: regular. Reactivity: " brisk. Consensual response: intact.   Left pupil: Shape: regular. Reactivity: brisk. Consensual response: intact.   Nystagmus: none   Diplopia: none  Ophthalmoparesis: none  Upgaze: normal  Downgaze: normal  Conjugate gaze: present  Vestibulo-ocular reflex: present    CN V   Facial sensation intact.   Right corneal reflex: normal  Left corneal reflex: normal    CN VII   Right facial weakness: none  Left facial weakness: none    CN VIII   Hearing: intact    CN IX, X   Palate: symmetric  Right gag reflex: normal  Left gag reflex: normal    CN XI   Right sternocleidomastoid strength: normal  Left sternocleidomastoid strength: normal    CN XII   Tongue: not atrophic  Fasciculations: absent  Tongue deviation: none    Motor Exam   Muscle bulk: normal  Overall muscle tone: normal  Right arm tone: increased  Left arm tone: normal  Right arm pronator drift: present  Right leg tone: normal  Left leg tone: normal    Strength   Left iliopsoas: 4/5  Left quadriceps: 4/5  Left hamstrin/5  Left glutei: 4/5  Left anterior tibial: 4/5  Left posterior tibial: 4/5  Left peroneal: 4/5  Left gastroc: 4/5    Sensory Exam   Light touch normal.   Vibration normal.   Proprioception normal.   Pinprick normal.     Gait, Coordination, and Reflexes     Gait  Gait: circumduction, spastic and wide-based (left leg)    Coordination   Romberg: positive  Tandem walking coordination: abnormal    Tremor   Resting tremor: absent  Intention tremor: present (R > L )  Action tremor: right arm    Reflexes   Right brachioradialis: 3+  Left brachioradialis: 3+  Right biceps: 3+  Left biceps: 3+  Right triceps: 3+  Left triceps: 3+  Right patellar: 3+  Left patellar: 3+  Right achilles: 3+  Left achilles: 3+        Physical Exam   Constitutional: She appears well-developed and well-nourished.   Neurological: She has an abnormal Romberg Test and an abnormal Tandem Gait Test.   Reflex Scores:       Tricep reflexes are 3+ on the right side and 3+ on the left  side.       Bicep reflexes are 3+ on the right side and 3+ on the left side.       Brachioradialis reflexes are 3+ on the right side and 3+ on the left side.       Patellar reflexes are 3+ on the right side and 3+ on the left side.       Achilles reflexes are 3+ on the right side and 3+ on the left side.  Nursing note and vitals reviewed.      Assessment/Plan:       Problems Addressed this Visit        Nervous and Auditory    Multiple sclerosis (CMS/HCC) - Primary     Sx stable on Tecfidera    CBC, CMP           Relevant Orders    CBC & Differential    Comprehensive Metabolic Panel    Neuropathic pain     Sx controlled on GBP             Musculoskeletal and Integument    Spastic     Sx wax and wane on Zanaflex, baclofen and GBP

## 2019-05-15 RX ORDER — BACLOFEN 10 MG/1
TABLET ORAL
Qty: 90 TABLET | Refills: 5 | Status: SHIPPED | OUTPATIENT
Start: 2019-05-15 | End: 2019-11-10 | Stop reason: SDUPTHER

## 2019-05-30 RX ORDER — TIZANIDINE 4 MG/1
TABLET ORAL
Qty: 60 TABLET | Refills: 5 | Status: SHIPPED | OUTPATIENT
Start: 2019-05-30 | End: 2019-11-30 | Stop reason: SDUPTHER

## 2019-05-30 RX ORDER — CYCLOBENZAPRINE HCL 10 MG
TABLET ORAL
Qty: 60 TABLET | Refills: 5 | Status: SHIPPED | OUTPATIENT
Start: 2019-05-30 | End: 2019-11-30 | Stop reason: SDUPTHER

## 2019-06-25 RX ORDER — GABAPENTIN 600 MG/1
TABLET ORAL
Qty: 90 TABLET | Refills: 5 | Status: SHIPPED | OUTPATIENT
Start: 2019-06-25 | End: 2019-12-09

## 2019-06-25 NOTE — TELEPHONE ENCOUNTER
Please send in the script for the patient. Patient has an appointment scheduled 11/15/2019. Thanks!

## 2019-07-11 ENCOUNTER — TELEPHONE (OUTPATIENT)
Dept: NEUROLOGY | Facility: CLINIC | Age: 39
End: 2019-07-11

## 2019-07-11 NOTE — TELEPHONE ENCOUNTER
Called [facility] pharmacist, spoke with Veronica, she stated that the patient has been experiencing side effects from Tecfidera. Patient reported to her that she was having short term memory issues, dizziness, frequent falls, muscle spasms, rt side facial numbness, light headedness, and she wakes up with an itch. These symptoms started on 6/28. Please advise. Thanks!

## 2019-07-11 NOTE — TELEPHONE ENCOUNTER
----- Message from Patty Salcedo sent at 7/11/2019 10:52 AM EDT -----  Regarding: MEDICATION REACTION  [facility] [phone]    Please call Veronica at [facility] to discuss reaction to medication.

## 2019-11-11 RX ORDER — BACLOFEN 10 MG/1
TABLET ORAL
Qty: 90 TABLET | Refills: 0 | Status: SHIPPED | OUTPATIENT
Start: 2019-11-11 | End: 2019-12-09 | Stop reason: SDUPTHER

## 2019-12-02 RX ORDER — TIZANIDINE 4 MG/1
TABLET ORAL
Qty: 60 TABLET | Refills: 0 | Status: SHIPPED | OUTPATIENT
Start: 2019-12-02 | End: 2020-06-01

## 2019-12-02 RX ORDER — CYCLOBENZAPRINE HCL 10 MG
TABLET ORAL
Qty: 60 TABLET | Refills: 0 | Status: SHIPPED | OUTPATIENT
Start: 2019-12-02 | End: 2020-11-23

## 2019-12-09 DIAGNOSIS — M79.2 NEUROPATHIC PAIN: Primary | ICD-10-CM

## 2019-12-09 RX ORDER — CYCLOBENZAPRINE HCL 10 MG
TABLET ORAL
Qty: 60 TABLET | Refills: 0 | Status: SHIPPED | OUTPATIENT
Start: 2019-12-09 | End: 2020-01-06

## 2019-12-09 RX ORDER — GABAPENTIN 600 MG/1
600 TABLET ORAL 3 TIMES DAILY
Qty: 90 TABLET | Refills: 5 | Status: SHIPPED | OUTPATIENT
Start: 2019-12-09 | End: 2020-06-15

## 2019-12-09 RX ORDER — GABAPENTIN 600 MG/1
600 TABLET ORAL 3 TIMES DAILY
Qty: 90 TABLET | Refills: 5 | Status: SHIPPED | OUTPATIENT
Start: 2019-12-09 | End: 2019-12-09

## 2019-12-09 RX ORDER — BACLOFEN 10 MG/1
TABLET ORAL
Qty: 90 TABLET | Refills: 0 | Status: SHIPPED | OUTPATIENT
Start: 2019-12-09 | End: 2020-01-06

## 2019-12-09 RX ORDER — TIZANIDINE 4 MG/1
TABLET ORAL
Qty: 60 TABLET | Refills: 0 | Status: SHIPPED | OUTPATIENT
Start: 2019-12-09 | End: 2020-01-06

## 2019-12-09 NOTE — TELEPHONE ENCOUNTER
Patient is requesting refill on Gabapentin 600mg to be sent to [facility] pharmacy on file. Next appt scheduled for 01/24/2020. Matt Hunt is on counter for your review.

## 2019-12-09 NOTE — TELEPHONE ENCOUNTER
----- Message from Patty Salcedo sent at 12/9/2019  8:14 AM EST -----  Regarding: refill  Contact: [phone]  Shaylee BARROS    Please refill Flexeril 10 mg

## 2019-12-10 ENCOUNTER — TELEPHONE (OUTPATIENT)
Dept: NEUROLOGY | Facility: CLINIC | Age: 39
End: 2019-12-10

## 2019-12-10 NOTE — TELEPHONE ENCOUNTER
[facility] pharmacy called stating that the nurse had called to check on patient in regards to her Tecfidera.  The patient stated that she was having some dizziness and balance issues.    I called the patient to check on her.  Pt states that she only experiences these symptoms when she is out in large crowds or move too quickly.  She doesn't feel like she needs to come in at this time but if need she will call or go to ER. Pt verbalizes understanding.

## 2020-01-06 RX ORDER — CYCLOBENZAPRINE HCL 10 MG
TABLET ORAL
Qty: 60 TABLET | Refills: 0 | Status: SHIPPED | OUTPATIENT
Start: 2020-01-06 | End: 2020-02-12

## 2020-01-06 RX ORDER — TIZANIDINE 4 MG/1
TABLET ORAL
Qty: 60 TABLET | Refills: 0 | Status: SHIPPED | OUTPATIENT
Start: 2020-01-06 | End: 2020-02-06

## 2020-01-06 RX ORDER — BACLOFEN 10 MG/1
TABLET ORAL
Qty: 90 TABLET | Refills: 0 | Status: SHIPPED | OUTPATIENT
Start: 2020-01-06 | End: 2020-02-06

## 2020-01-07 ENCOUNTER — SPECIALTY PHARMACY (OUTPATIENT)
Dept: ONCOLOGY | Facility: HOSPITAL | Age: 40
End: 2020-01-07

## 2020-01-07 RX ORDER — DIMETHYL FUMARATE 240 MG/1
240 CAPSULE ORAL 2 TIMES DAILY
Qty: 60 CAPSULE | Refills: 11 | Status: SHIPPED | OUTPATIENT
Start: 2020-01-07 | End: 2021-01-07 | Stop reason: SDUPTHER

## 2020-02-06 RX ORDER — TIZANIDINE 4 MG/1
TABLET ORAL
Qty: 60 TABLET | Refills: 1 | Status: SHIPPED | OUTPATIENT
Start: 2020-02-06 | End: 2020-03-10

## 2020-02-06 RX ORDER — BACLOFEN 10 MG/1
TABLET ORAL
Qty: 90 TABLET | Refills: 1 | Status: SHIPPED | OUTPATIENT
Start: 2020-02-06 | End: 2020-03-10

## 2020-02-12 RX ORDER — CYCLOBENZAPRINE HCL 10 MG
TABLET ORAL
Qty: 60 TABLET | Refills: 0 | Status: SHIPPED | OUTPATIENT
Start: 2020-02-12 | End: 2020-03-10

## 2020-03-10 RX ORDER — TIZANIDINE 4 MG/1
TABLET ORAL
Qty: 60 TABLET | Refills: 1 | Status: SHIPPED | OUTPATIENT
Start: 2020-03-10 | End: 2020-05-22

## 2020-03-10 RX ORDER — BACLOFEN 10 MG/1
TABLET ORAL
Qty: 90 TABLET | Refills: 1 | Status: SHIPPED | OUTPATIENT
Start: 2020-03-10 | End: 2020-06-05

## 2020-03-10 RX ORDER — CYCLOBENZAPRINE HCL 10 MG
TABLET ORAL
Qty: 60 TABLET | Refills: 0 | Status: SHIPPED | OUTPATIENT
Start: 2020-03-10 | End: 2020-04-13

## 2020-04-13 RX ORDER — CYCLOBENZAPRINE HCL 10 MG
TABLET ORAL
Qty: 60 TABLET | Refills: 11 | Status: SHIPPED | OUTPATIENT
Start: 2020-04-13 | End: 2020-05-22

## 2020-05-22 ENCOUNTER — LAB (OUTPATIENT)
Dept: LAB | Facility: HOSPITAL | Age: 40
End: 2020-05-22

## 2020-05-22 ENCOUNTER — OFFICE VISIT (OUTPATIENT)
Dept: NEUROLOGY | Facility: CLINIC | Age: 40
End: 2020-05-22

## 2020-05-22 VITALS
SYSTOLIC BLOOD PRESSURE: 121 MMHG | HEIGHT: 66 IN | BODY MASS INDEX: 27.51 KG/M2 | DIASTOLIC BLOOD PRESSURE: 78 MMHG | HEART RATE: 92 BPM | WEIGHT: 171.2 LBS | OXYGEN SATURATION: 98 % | TEMPERATURE: 97.1 F

## 2020-05-22 DIAGNOSIS — G35 MULTIPLE SCLEROSIS (HCC): ICD-10-CM

## 2020-05-22 DIAGNOSIS — M79.2 NEUROPATHIC PAIN: ICD-10-CM

## 2020-05-22 DIAGNOSIS — R25.2 SPASTIC: ICD-10-CM

## 2020-05-22 DIAGNOSIS — G35 MULTIPLE SCLEROSIS (HCC): Primary | ICD-10-CM

## 2020-05-22 LAB
ALBUMIN SERPL-MCNC: 4.3 G/DL (ref 3.5–5.2)
ALBUMIN/GLOB SERPL: 1.3 G/DL
ALP SERPL-CCNC: 50 U/L (ref 39–117)
ALT SERPL W P-5'-P-CCNC: 17 U/L (ref 1–33)
ANION GAP SERPL CALCULATED.3IONS-SCNC: 12.8 MMOL/L (ref 5–15)
AST SERPL-CCNC: 19 U/L (ref 1–32)
BASOPHILS # BLD AUTO: 0.06 10*3/MM3 (ref 0–0.2)
BASOPHILS NFR BLD AUTO: 0.8 % (ref 0–1.5)
BILIRUB SERPL-MCNC: 0.3 MG/DL (ref 0.2–1.2)
BUN BLD-MCNC: 13 MG/DL (ref 6–20)
BUN/CREAT SERPL: 20.3 (ref 7–25)
CALCIUM SPEC-SCNC: 9.6 MG/DL (ref 8.6–10.5)
CHLORIDE SERPL-SCNC: 101 MMOL/L (ref 98–107)
CO2 SERPL-SCNC: 23.2 MMOL/L (ref 22–29)
CREAT BLD-MCNC: 0.64 MG/DL (ref 0.57–1)
DEPRECATED RDW RBC AUTO: 39.1 FL (ref 37–54)
EOSINOPHIL # BLD AUTO: 0.14 10*3/MM3 (ref 0–0.4)
EOSINOPHIL NFR BLD AUTO: 2 % (ref 0.3–6.2)
ERYTHROCYTE [DISTWIDTH] IN BLOOD BY AUTOMATED COUNT: 12.7 % (ref 12.3–15.4)
GFR SERPL CREATININE-BSD FRML MDRD: 102 ML/MIN/1.73
GLOBULIN UR ELPH-MCNC: 3.2 GM/DL
GLUCOSE BLD-MCNC: 83 MG/DL (ref 65–99)
HCT VFR BLD AUTO: 40.8 % (ref 34–46.6)
HGB BLD-MCNC: 14.1 G/DL (ref 12–15.9)
IMM GRANULOCYTES # BLD AUTO: 0.03 10*3/MM3 (ref 0–0.05)
IMM GRANULOCYTES NFR BLD AUTO: 0.4 % (ref 0–0.5)
LYMPHOCYTES # BLD AUTO: 1.31 10*3/MM3 (ref 0.7–3.1)
LYMPHOCYTES NFR BLD AUTO: 18.3 % (ref 19.6–45.3)
MCH RBC QN AUTO: 29.3 PG (ref 26.6–33)
MCHC RBC AUTO-ENTMCNC: 34.6 G/DL (ref 31.5–35.7)
MCV RBC AUTO: 84.8 FL (ref 79–97)
MONOCYTES # BLD AUTO: 0.33 10*3/MM3 (ref 0.1–0.9)
MONOCYTES NFR BLD AUTO: 4.6 % (ref 5–12)
NEUTROPHILS # BLD AUTO: 5.28 10*3/MM3 (ref 1.7–7)
NEUTROPHILS NFR BLD AUTO: 73.9 % (ref 42.7–76)
NRBC BLD AUTO-RTO: 0 /100 WBC (ref 0–0.2)
PLATELET # BLD AUTO: 215 10*3/MM3 (ref 140–450)
PMV BLD AUTO: 9.9 FL (ref 6–12)
POTASSIUM BLD-SCNC: 5.1 MMOL/L (ref 3.5–5.2)
PROT SERPL-MCNC: 7.5 G/DL (ref 6–8.5)
RBC # BLD AUTO: 4.81 10*6/MM3 (ref 3.77–5.28)
SODIUM BLD-SCNC: 137 MMOL/L (ref 136–145)
WBC NRBC COR # BLD: 7.15 10*3/MM3 (ref 3.4–10.8)

## 2020-05-22 PROCEDURE — 99214 OFFICE O/P EST MOD 30 MIN: CPT | Performed by: PSYCHIATRY & NEUROLOGY

## 2020-05-22 PROCEDURE — 85025 COMPLETE CBC W/AUTO DIFF WBC: CPT

## 2020-05-22 PROCEDURE — 36415 COLL VENOUS BLD VENIPUNCTURE: CPT

## 2020-05-22 PROCEDURE — 80053 COMPREHEN METABOLIC PANEL: CPT

## 2020-05-22 NOTE — ASSESSMENT & PLAN NOTE
[facility] scores recommend PT will refer locally in [facility] Tecfidera     CBC, CMP

## 2020-05-22 NOTE — PROGRESS NOTES
Subjective:   Chief Complaint   Patient presents with   • [facility]       Patient ID: Daya Ashford is a 39 y.o. female.    History of Present Illness     39 y.o.  woman with RRMS and spasticity returns in follow up.  Last visit on 5/14/19 continued Tecfidera, baclofen, zanaflex, GBP and ordered CBC,CMP.    CBC,CMP -  NCS 5/14/19    MRI Brain, 1/7/19, moderate T2 lesion load without enhancing lesions.     JCV 1/23/17 1.2     RRMS    MSFC scores are stable but reveal impairment in gait and UE dexterity     Compliant with Tecfidera and tolerating without difficulty.      Gait is spastic and unsteady.      ST memory, attention/concentraion difficulties .     Legs and hands will spasm with cooler weather      Neuropathic pain     Increased low back and leg pain.  Sharp shooting pains.    Family has CMT.     PMH:    Transferred to Dr Almaguer for RRMS.  Sx started in 2007 loss of vision in right eye for several days.  2008 left hemisensory loss for two weeks in October, then in December 2008 right sided numbness.  Dx 2009 and started on Rebif but had frequent episodes of numbness from waist down.  Swtiched to Tysabri for 6 months.  Then changed to Rebif for a year.  Back on Tysabri for 6 months.  Then back to Rebif for approx 6 years.     UTI and appendicitis in 2014 with appendectomy and since has had frequent UTI's and neurogenic bladders    Progressive decrease in gait and hand dexterity on Rebif.    Reviewed Dr Almaguer' notes:    Sx of muscle spasms, back pain, urinary retention and frequent UTI's.  Currently taking Rebif after taking Tysabri twice and stopped due to JCV positive ab.  Dx 10/2007 optic neuritis.  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    Review of Systems   Constitutional: Negative for activity change and unexpected weight change.   HENT: Negative for tinnitus and trouble swallowing.    Eyes: Positive for visual disturbance. Negative for photophobia.   Respiratory: Negative for apnea and choking.    Cardiovascular: Negative for leg swelling.   Endocrine: Positive for heat intolerance. Negative for cold intolerance.   Genitourinary: Positive for difficulty urinating, frequency and urgency. Negative for menstrual problem.   Musculoskeletal: Positive for gait problem. Negative for back pain.   Skin: Negative for color change.   Allergic/Immunologic: Negative for immunocompromised state.   Neurological: Positive for tremors. Negative for dizziness, seizures, syncope, facial asymmetry, speech difficulty and light-headedness.   Hematological: Negative for adenopathy. Does not bruise/bleed easily.   Psychiatric/Behavioral: Positive for decreased concentration. Negative for behavioral problems, confusion, hallucinations and sleep disturbance.        Objective:  Vitals:    05/22/20 1025   BP: 121/78   Pulse: 92   Temp: 97.1 °F (36.2 °C)   SpO2: 98%   Weight: 77.7 kg (171 lb 3.2 oz)   Height: 167.6 cm (66\")       Neurologic Exam     Mental Status   Registration: recalls 3 of 3 objects. Recall at 5 minutes: recalls 3 of 3 objects. Follows 3 step commands.   Attention: normal. Concentration: normal.   Level of consciousness: alert  Knowledge: good and consistent with education.   Able to name object. Able to read. Able to repeat. Able to write. Normal comprehension.     Cranial Nerves     CN II   Visual fields full to confrontation.   Visual acuity: normal  Right visual field deficit: none  Left visual field deficit: none     CN III, IV, VI   Right pupil: Shape: regular. Reactivity: brisk. Consensual response: " intact.   Left pupil: Shape: regular. Reactivity: brisk. Consensual response: intact.   Nystagmus: none   Diplopia: none  Ophthalmoparesis: none  Upgaze: normal  Downgaze: normal  Conjugate gaze: present  Vestibulo-ocular reflex: present    CN V   Facial sensation intact.   Right corneal reflex: normal  Left corneal reflex: normal    CN VII   Right facial weakness: none  Left facial weakness: none    CN VIII   Hearing: intact    CN IX, X   Palate: symmetric  Right gag reflex: normal  Left gag reflex: normal    CN XI   Right sternocleidomastoid strength: normal  Left sternocleidomastoid strength: normal    CN XII   Tongue: not atrophic  Fasciculations: absent  Tongue deviation: none    Motor Exam   Muscle bulk: normal  Overall muscle tone: normal  Right arm tone: increased  Left arm tone: normal  Right arm pronator drift: present  Right leg tone: normal  Left leg tone: normal    Strength   Left iliopsoas: 4/5  Left quadriceps: 4/5  Left hamstrin/5  Left glutei: 4/5  Left anterior tibial: 4/5  Left posterior tibial: 4/5  Left peroneal: 4/5  Left gastroc: 4/5    Sensory Exam   Light touch normal.   Vibration normal.   Proprioception normal.   Pinprick normal.     Gait, Coordination, and Reflexes     Gait  Gait: circumduction, spastic and wide-based (left leg)    Coordination   Romberg: positive  Tandem walking coordination: abnormal    Tremor   Resting tremor: absent  Intention tremor: present (R > L )  Action tremor: right arm    Reflexes   Right brachioradialis: 3+  Left brachioradialis: 3+  Right biceps: 3+  Left biceps: 3+  Right triceps: 3+  Left triceps: 3+  Right patellar: 3+  Left patellar: 3+  Right achilles: 3+  Left achilles: 3+        Physical Exam   Constitutional: She appears well-developed and well-nourished.   Neurological: She has an abnormal Romberg Test and an abnormal Tandem Gait Test.   Reflex Scores:       Tricep reflexes are 3+ on the right side and 3+ on the left side.       Bicep reflexes  are 3+ on the right side and 3+ on the left side.       Brachioradialis reflexes are 3+ on the right side and 3+ on the left side.       Patellar reflexes are 3+ on the right side and 3+ on the left side.       Achilles reflexes are 3+ on the right side and 3+ on the left side.  Nursing note and vitals reviewed.      Assessment/Plan:       Problems Addressed this Visit        Nervous and Auditory    Multiple sclerosis (CMS/HCC) - Primary     MSFC scores recommend PT will refer locally in [place]    Continue Tecfidera     CBC, CMP          Relevant Orders    CBC & Differential    Comprehensive Metabolic Panel    MRI Brain With & Without Contrast    MRI Cervical Spine With & Without Contrast    Neuropathic pain     Sx stable on GBP             Musculoskeletal and Integument    Spastic     Sx persistent on Zanaflex, baclofen, flexeril

## 2020-06-01 ENCOUNTER — TELEPHONE (OUTPATIENT)
Dept: NEUROLOGY | Facility: CLINIC | Age: 40
End: 2020-06-01

## 2020-06-01 RX ORDER — TIZANIDINE 4 MG/1
TABLET ORAL
Qty: 60 TABLET | Refills: 5 | Status: SHIPPED | OUTPATIENT
Start: 2020-06-01 | End: 2020-11-23

## 2020-06-01 NOTE — TELEPHONE ENCOUNTER
----- Message from Patty Salcedo sent at 6/1/2020  2:25 PM EDT -----  Regarding: LAB RESULTS  Contact: [phone]  Patient request lab results

## 2020-06-05 RX ORDER — BACLOFEN 10 MG/1
TABLET ORAL
Qty: 90 TABLET | Refills: 1 | Status: SHIPPED | OUTPATIENT
Start: 2020-06-05 | End: 2020-08-03

## 2020-06-14 DIAGNOSIS — M79.2 NEUROPATHIC PAIN: ICD-10-CM

## 2020-06-15 RX ORDER — GABAPENTIN 600 MG/1
TABLET ORAL
Qty: 90 TABLET | Refills: 5 | Status: SHIPPED | OUTPATIENT
Start: 2020-06-15 | End: 2020-12-17

## 2020-06-23 ENCOUNTER — TELEPHONE (OUTPATIENT)
Dept: NEUROLOGY | Facility: CLINIC | Age: 40
End: 2020-06-23

## 2020-06-23 NOTE — TELEPHONE ENCOUNTER
Patient called and would like someone to contact her to go over her MRI results from May.     Could someone please contact her?     Daya Ashford   [phone]      Thank you

## 2020-08-03 RX ORDER — BACLOFEN 10 MG/1
TABLET ORAL
Qty: 90 TABLET | Refills: 1 | Status: SHIPPED | OUTPATIENT
Start: 2020-08-03 | End: 2020-09-30

## 2020-09-17 ENCOUNTER — SPECIALTY PHARMACY (OUTPATIENT)
Dept: ONCOLOGY | Facility: HOSPITAL | Age: 40
End: 2020-09-17

## 2020-09-17 RX ORDER — DIMETHYL FUMARATE 240 MG/1
240 CAPSULE ORAL 2 TIMES DAILY
Qty: 60 CAPSULE | Refills: 11 | Status: SHIPPED | OUTPATIENT
Start: 2020-09-17 | End: 2020-09-18

## 2020-09-30 RX ORDER — BACLOFEN 10 MG/1
TABLET ORAL
Qty: 90 TABLET | Refills: 1 | Status: SHIPPED | OUTPATIENT
Start: 2020-09-30 | End: 2020-11-23

## 2020-11-05 ENCOUNTER — SPECIALTY PHARMACY (OUTPATIENT)
Dept: ONCOLOGY | Facility: HOSPITAL | Age: 40
End: 2020-11-05

## 2020-11-05 RX ORDER — DIMETHYL FUMARATE 240 MG/1
240 CAPSULE ORAL 2 TIMES DAILY
Qty: 60 CAPSULE | Refills: 11 | Status: SHIPPED | OUTPATIENT
Start: 2020-11-05 | End: 2020-11-05

## 2020-11-23 ENCOUNTER — LAB (OUTPATIENT)
Dept: LAB | Facility: HOSPITAL | Age: 40
End: 2020-11-23

## 2020-11-23 ENCOUNTER — OFFICE VISIT (OUTPATIENT)
Dept: NEUROLOGY | Facility: CLINIC | Age: 40
End: 2020-11-23

## 2020-11-23 VITALS
WEIGHT: 169.2 LBS | DIASTOLIC BLOOD PRESSURE: 78 MMHG | SYSTOLIC BLOOD PRESSURE: 134 MMHG | TEMPERATURE: 98 F | OXYGEN SATURATION: 97 % | HEART RATE: 129 BPM | BODY MASS INDEX: 27.19 KG/M2 | HEIGHT: 66 IN

## 2020-11-23 DIAGNOSIS — G35 MULTIPLE SCLEROSIS (HCC): ICD-10-CM

## 2020-11-23 DIAGNOSIS — M79.2 NEUROPATHIC PAIN: ICD-10-CM

## 2020-11-23 DIAGNOSIS — R25.2 SPASTIC: ICD-10-CM

## 2020-11-23 DIAGNOSIS — Z79.899 CONTROLLED SUBSTANCE AGREEMENT SIGNED: ICD-10-CM

## 2020-11-23 PROCEDURE — 80053 COMPREHEN METABOLIC PANEL: CPT

## 2020-11-23 PROCEDURE — 36415 COLL VENOUS BLD VENIPUNCTURE: CPT

## 2020-11-23 PROCEDURE — 80307 DRUG TEST PRSMV CHEM ANLYZR: CPT

## 2020-11-23 PROCEDURE — 99214 OFFICE O/P EST MOD 30 MIN: CPT | Performed by: NURSE PRACTITIONER

## 2020-11-23 PROCEDURE — 85025 COMPLETE CBC W/AUTO DIFF WBC: CPT

## 2020-11-23 RX ORDER — BACLOFEN 10 MG/1
TABLET ORAL
Qty: 90 TABLET | Refills: 1 | Status: SHIPPED | OUTPATIENT
Start: 2020-11-23 | End: 2020-11-23 | Stop reason: SDUPTHER

## 2020-11-23 RX ORDER — BACLOFEN 10 MG/1
10 TABLET ORAL 3 TIMES DAILY
Qty: 90 TABLET | Refills: 5 | Status: SHIPPED | OUTPATIENT
Start: 2020-11-23 | End: 2021-06-07

## 2020-11-23 RX ORDER — TIZANIDINE 4 MG/1
TABLET ORAL
Qty: 60 TABLET | Refills: 5 | Status: SHIPPED | OUTPATIENT
Start: 2020-11-23 | End: 2020-11-23 | Stop reason: SDUPTHER

## 2020-11-23 RX ORDER — TIZANIDINE 4 MG/1
8 TABLET ORAL
Qty: 60 TABLET | Refills: 5 | Status: SHIPPED | OUTPATIENT
Start: 2020-11-23 | End: 2021-06-07

## 2020-11-23 NOTE — ASSESSMENT & PLAN NOTE
Stable on  mg PO TID     Drug screen ordered     AMILCAR consistent     Controlled substance agreement discussed with patient and signed

## 2020-11-23 NOTE — ASSESSMENT & PLAN NOTE
Stable on Tecfidera, discussed financial situation with our financial counselor who will contact patient regarding co-pays.     Ordered CBC, CMP     MRI UTD     Does not wish to do PT currently due to COVID, states her cousin who is a PT works with her     F/U in 6 months or sooner if needed

## 2020-11-23 NOTE — PROGRESS NOTES
Subjective:   Chief Complaint   Patient presents with   • Multiple Sclerosis     6 month follow up        Patient ID: Daya Ashford is a 40 y.o. female.    History of Present Illness     40 y.o.  woman with RRMS and spasticity returns in follow up.  Last visit on 5/22/20 continued Tecfidera, baclofen, zanaflex, GBP and ordered CBC,CMP, and referred to PT in [place].     CBC,CMP -  NCS 5/22/20    MRI Brain and cervical spine, 6/9/20  moderate T2 lesion load without new, enlarging, or enhancing lesions. No disc for independent review.      JCV 1/23/17 1.2     RRMS    MSFC scores are stable but reveal impairment in gait and UE dexterity     Compliant with Tecfidera and tolerating without difficulty, states her pham money is out and she is unable to afford her medication. Using samples at this time.     Gait is spastic and unsteady.  Taking Baclofen TID, and Tizanidine 8mg PO QHS. Went to a couple of visit of PT, her cousin is a PT and helps her.     Denies falls since her previous appointment.     ST memory, attention/concentraion difficulties .     Legs and hands will spasm with cooler weather.    Neuropathic pain     Increased low back and leg pain.  Sharp shooting pains.       mg PO TID.     Family has CMT.     PMH:    Transferred to Dr Almaguer for RRMS.  Sx started in 2007 loss of vision in right eye for several days.  2008 left hemisensory loss for two weeks in October, then in December 2008 right sided numbness.  Dx 2009 and started on Rebif but had frequent episodes of numbness from waist down.  Swtiched to Tysabri for 6 months.  Then changed to Rebif for a year.  Back on Tysabri for 6 months.  Then back to Rebif for approx 6 years.     UTI and appendicitis in 2014 with appendectomy and since has had frequent UTI's and neurogenic bladders    Progressive decrease in gait and hand dexterity on Rebif.    Reviewed Dr Almaguer' notes:    Sx of muscle spasms, back pain, urinary retention and frequent UTI's.  " Currently taking Rebif after taking Tysabri twice and stopped due to JCV positive ab.  Dx 10/2007 optic neuritis.  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    Review of Systems   Constitutional: Negative for activity change and unexpected weight change.   HENT: Negative for tinnitus and trouble swallowing.    Eyes: Positive for visual disturbance. Negative for photophobia.   Respiratory: Negative for apnea and choking.    Cardiovascular: Negative for leg swelling.   Endocrine: Positive for heat intolerance. Negative for cold intolerance.   Genitourinary: Positive for difficulty urinating, frequency and urgency. Negative for menstrual problem.   Musculoskeletal: Positive for gait problem. Negative for back pain.   Skin: Negative for color change.   Allergic/Immunologic: Negative for immunocompromised state.   Neurological: Positive for tremors. Negative for dizziness, seizures, syncope, facial asymmetry, speech difficulty and light-headedness.   Hematological: Negative for adenopathy. Does not bruise/bleed easily.   Psychiatric/Behavioral: Positive for decreased concentration. Negative for behavioral problems, confusion, hallucinations and sleep disturbance.        Objective:  Vitals:    11/23/20 1448   BP: 134/78   Pulse: (!) 129   Temp: 98 °F (36.7 °C)   SpO2: 97%   Weight: 76.7 kg (169 lb 3.2 oz)   Height: 167.6 cm (66\")       Neurologic Exam     Mental Status   Registration: recalls 3 of 3 objects. Recall at 5 minutes: recalls 3 of 3 objects. Follows 3 step commands.   Attention: normal. Concentration: normal.   Level of " consciousness: alert  Knowledge: good and consistent with education.   Able to name object. Able to read. Able to repeat. Able to write. Normal comprehension.     Cranial Nerves     CN II   Visual fields full to confrontation.   Visual acuity: normal  Right visual field deficit: none  Left visual field deficit: none     CN III, IV, VI   Right pupil: Shape: regular. Reactivity: brisk. Consensual response: intact.   Left pupil: Shape: regular. Reactivity: brisk. Consensual response: intact.   Nystagmus: none   Diplopia: none  Ophthalmoparesis: none  Upgaze: normal  Downgaze: normal  Conjugate gaze: present  Vestibulo-ocular reflex: present    CN V   Facial sensation intact.   Right corneal reflex: normal  Left corneal reflex: normal    CN VII   Right facial weakness: none  Left facial weakness: none    CN VIII   Hearing: intact    CN IX, X   Palate: symmetric  Right gag reflex: normal  Left gag reflex: normal    CN XI   Right sternocleidomastoid strength: normal  Left sternocleidomastoid strength: normal    CN XII   Tongue: not atrophic  Fasciculations: absent  Tongue deviation: none    Motor Exam   Muscle bulk: normal  Overall muscle tone: normal  Right arm tone: increased  Left arm tone: normal  Right arm pronator drift: present  Right leg tone: normal  Left leg tone: normal    Strength   Left iliopsoas: 4/5  Left quadriceps: 4/5  Left hamstrin/5  Left glutei: 4/5  Left anterior tibial: 4/5  Left posterior tibial: 4/5  Left peroneal: 4/5  Left gastroc: 4/5    Sensory Exam   Light touch normal.   Vibration normal.   Proprioception normal.   Pinprick normal.     Gait, Coordination, and Reflexes     Gait  Gait: circumduction, spastic and wide-based (left leg)    Coordination   Romberg: positive  Tandem walking coordination: abnormal    Tremor   Resting tremor: absent  Intention tremor: present (R > L )  Action tremor: right arm    Reflexes   Right brachioradialis: 3+  Left brachioradialis: 3+  Right biceps:  3+  Left biceps: 3+  Right triceps: 3+  Left triceps: 3+  Right patellar: 3+  Left patellar: 3+  Right achilles: 3+  Left achilles: 3+        Physical Exam   Constitutional: She appears well-developed.   Neurological: She has an abnormal Romberg Test and an abnormal Tandem Gait Test.   Reflex Scores:       Tricep reflexes are 3+ on the right side and 3+ on the left side.       Bicep reflexes are 3+ on the right side and 3+ on the left side.       Brachioradialis reflexes are 3+ on the right side and 3+ on the left side.       Patellar reflexes are 3+ on the right side and 3+ on the left side.       Achilles reflexes are 3+ on the right side and 3+ on the left side.  Nursing note and vitals reviewed.      Assessment/Plan:       Problems Addressed this Visit        Nervous and Auditory    Multiple sclerosis (CMS/HCC)     Stable on Tecfidera, discussed financial situation with our financial counselor who will contact patient regarding co-pays.     Ordered CBC, CMP     MRI UTD     Does not wish to do PT currently due to COVID, states her cousin who is a PT works with her     F/U in 6 months or sooner if needed         Relevant Orders    CBC & Differential    Comprehensive Metabolic Panel    Drug Screen (10), Serum    Neuropathic pain     Stable on  mg PO TID     Drug screen ordered     AMILCAR velásquez     Controlled substance agreement discussed with patient and signed                Musculoskeletal and Integument    Spastic     Refilled Baclofen and Zanaflex          Relevant Medications    tiZANidine (ZANAFLEX) 4 MG tablet    baclofen (LIORESAL) 10 MG tablet       Other    Controlled substance agreement signed    Relevant Orders    Drug Screen (10), Serum      Diagnoses       Codes Comments    Neuropathic pain     ICD-10-CM: M79.2  ICD-9-CM: 729.2     Spastic     ICD-10-CM: R25.2  ICD-9-CM: 781.0     Multiple sclerosis (CMS/HCC)     ICD-10-CM: G35  ICD-9-CM: 340     Controlled substance agreement signed      ICD-10-CM: Z79.899  ICD-9-CM: V58.69          Return in about 6 months (around 5/23/2021).

## 2020-11-24 ENCOUNTER — TELEPHONE (OUTPATIENT)
Dept: NEUROLOGY | Facility: CLINIC | Age: 40
End: 2020-11-24

## 2020-11-24 LAB
ALBUMIN SERPL-MCNC: 4.4 G/DL (ref 3.5–5.2)
ALBUMIN/GLOB SERPL: 1.3 G/DL
ALP SERPL-CCNC: 65 U/L (ref 39–117)
ALT SERPL W P-5'-P-CCNC: 19 U/L (ref 1–33)
ANION GAP SERPL CALCULATED.3IONS-SCNC: 7.9 MMOL/L (ref 5–15)
AST SERPL-CCNC: 13 U/L (ref 1–32)
BASOPHILS # BLD AUTO: 0.07 10*3/MM3 (ref 0–0.2)
BASOPHILS NFR BLD AUTO: 0.8 % (ref 0–1.5)
BILIRUB SERPL-MCNC: <0.2 MG/DL (ref 0–1.2)
BUN SERPL-MCNC: 16 MG/DL (ref 6–20)
BUN/CREAT SERPL: 19.8 (ref 7–25)
CALCIUM SPEC-SCNC: 9.5 MG/DL (ref 8.6–10.5)
CHLORIDE SERPL-SCNC: 95 MMOL/L (ref 98–107)
CO2 SERPL-SCNC: 28.1 MMOL/L (ref 22–29)
CREAT SERPL-MCNC: 0.81 MG/DL (ref 0.57–1)
DEPRECATED RDW RBC AUTO: 36.5 FL (ref 37–54)
EOSINOPHIL # BLD AUTO: 0.1 10*3/MM3 (ref 0–0.4)
EOSINOPHIL NFR BLD AUTO: 1.2 % (ref 0.3–6.2)
ERYTHROCYTE [DISTWIDTH] IN BLOOD BY AUTOMATED COUNT: 12 % (ref 12.3–15.4)
GFR SERPL CREATININE-BSD FRML MDRD: 78 ML/MIN/1.73
GLOBULIN UR ELPH-MCNC: 3.3 GM/DL
GLUCOSE SERPL-MCNC: 87 MG/DL (ref 65–99)
HCT VFR BLD AUTO: 46.6 % (ref 34–46.6)
HGB BLD-MCNC: 15.6 G/DL (ref 12–15.9)
IMM GRANULOCYTES # BLD AUTO: 0.06 10*3/MM3 (ref 0–0.05)
IMM GRANULOCYTES NFR BLD AUTO: 0.7 % (ref 0–0.5)
LYMPHOCYTES # BLD AUTO: 1.28 10*3/MM3 (ref 0.7–3.1)
LYMPHOCYTES NFR BLD AUTO: 15.1 % (ref 19.6–45.3)
MCH RBC QN AUTO: 28.3 PG (ref 26.6–33)
MCHC RBC AUTO-ENTMCNC: 33.5 G/DL (ref 31.5–35.7)
MCV RBC AUTO: 84.4 FL (ref 79–97)
MONOCYTES # BLD AUTO: 0.38 10*3/MM3 (ref 0.1–0.9)
MONOCYTES NFR BLD AUTO: 4.5 % (ref 5–12)
NEUTROPHILS NFR BLD AUTO: 6.61 10*3/MM3 (ref 1.7–7)
NEUTROPHILS NFR BLD AUTO: 77.7 % (ref 42.7–76)
NRBC BLD AUTO-RTO: 0 /100 WBC (ref 0–0.2)
PLATELET # BLD AUTO: 226 10*3/MM3 (ref 140–450)
PMV BLD AUTO: 9.7 FL (ref 6–12)
POTASSIUM SERPL-SCNC: 3.9 MMOL/L (ref 3.5–5.2)
PROT SERPL-MCNC: 7.7 G/DL (ref 6–8.5)
RBC # BLD AUTO: 5.52 10*6/MM3 (ref 3.77–5.28)
SODIUM SERPL-SCNC: 131 MMOL/L (ref 136–145)
WBC # BLD AUTO: 8.5 10*3/MM3 (ref 3.4–10.8)

## 2020-11-24 NOTE — TELEPHONE ENCOUNTER
----- Message from AARON Wood sent at 11/24/2020  9:05 AM EST -----  Please let her know that her sodium level is a little low, I recommend she add a little bit of salt to her diet to increase this.

## 2020-11-28 LAB
AMPHETAMINES SERPL QL SCN: NEGATIVE NG/ML
BARBITURATES SERPL QL SCN: NEGATIVE UG/ML
BENZODIAZ SERPL QL SCN: NEGATIVE NG/ML
CANNABINOIDS SERPL QL SCN: NEGATIVE NG/ML
COCAINE+BZE SERPL QL SCN: NEGATIVE NG/ML
METHADONE SERPL QL SCN: NEGATIVE NG/ML
OPIATES SERPL QL SCN: NEGATIVE NG/ML
OXYCODONE+OXYMORPHONE SERPLBLD QL SCN: NEGATIVE NG/ML
PCP SERPL QL SCN: NEGATIVE NG/ML
PROPOXYPH SERPL QL SCN: NEGATIVE NG/ML

## 2020-12-11 ENCOUNTER — TELEPHONE (OUTPATIENT)
Dept: NEUROLOGY | Facility: CLINIC | Age: 40
End: 2020-12-11

## 2020-12-11 NOTE — TELEPHONE ENCOUNTER
PT CALLING STATING THAT SHE WAS TOLD BY AARON DELGADO IN THE OFFICE AT HR LAST OFFICE VISIT THAT THEY WOULD MAIL HER SOME SAMPLES OF THE MEDICATION Tecfidera AND SHE HAS NOT RECEIVED ANY YET.     PLEASE CALL HER BACK [phone] AND LET HER KNOW IF SOME WILL BE MAILED TO HER

## 2020-12-14 NOTE — TELEPHONE ENCOUNTER
I mailed samples to address on file.     In the future, this is something Nu is responsible for doing. I do not mail out for this drug.

## 2020-12-16 DIAGNOSIS — M79.2 NEUROPATHIC PAIN: ICD-10-CM

## 2020-12-17 RX ORDER — GABAPENTIN 600 MG/1
TABLET ORAL
Qty: 90 TABLET | Refills: 5 | Status: SHIPPED | OUTPATIENT
Start: 2020-12-17 | End: 2021-06-07

## 2021-01-07 ENCOUNTER — SPECIALTY PHARMACY (OUTPATIENT)
Dept: ONCOLOGY | Facility: HOSPITAL | Age: 41
End: 2021-01-07

## 2021-01-07 RX ORDER — DIMETHYL FUMARATE 240 MG/1
240 CAPSULE ORAL 2 TIMES DAILY
Qty: 60 CAPSULE | Refills: 11 | Status: SHIPPED | OUTPATIENT
Start: 2021-01-07 | End: 2021-02-16

## 2021-01-11 ENCOUNTER — TELEPHONE (OUTPATIENT)
Dept: NEUROLOGY | Facility: CLINIC | Age: 41
End: 2021-01-11

## 2021-01-11 NOTE — TELEPHONE ENCOUNTER
"Provider:   Caller: DEWAYNE  Relationship to Patient: SELF      Phone Number: [phone]  Reason for Call: PT STATES SHE IS ON \"CAT\" TO RECEIVE MEDICATIONS DUE TO THE PRICE, SHE IS CURRENTLY OUT OF MEDICATION AND WAS WONDERING WHEN THE \"CAT\" WOULD KICK BACK IN FOR HER TO BE ABLE TO RECEIVE MORE MEDICATION.    PLEASE ADVISE.     "

## 2021-01-12 ENCOUNTER — TELEPHONE (OUTPATIENT)
Dept: NEUROLOGY | Facility: CLINIC | Age: 41
End: 2021-01-12

## 2021-01-12 NOTE — TELEPHONE ENCOUNTER
Called x1. LVM for patient to call back so that we can discuss medication options. Left call back number.

## 2021-01-12 NOTE — TELEPHONE ENCOUNTER
----- Message from Nu Duncan RN sent at 1/12/2021 10:25 AM EST -----  Regarding: RE: Tecfidera/Vumerity switch  Called patient but no answer. LVDANIELA Jaramillo want me to schedule her for a visit if she wants to discuss things?  ----- Message -----  From: Ella Farooq APRN  Sent: 1/11/2021   4:11 PM EST  To: Nu Duncan RN, Fay Mazariegos  Subject: RE: Tecfidera/Vumerity switch                    Nu can you give her a call when you get back tomorrow and see if the patient is ok with switching?     Thanks!!   ----- Message -----  From: Fay Mazariegos  Sent: 1/11/2021   4:04 PM EST  To: Nu Duncan RN, #  Subject: RE: Tecfidera/Vumerity switch                    Get a new Start form signed by the patient. I can do the PA and it will approve. We can then notify Greg that the copay is too much. They will then enroll her in the free drug program. All foundations are closed, so it should move pretty quick. Just need the patient to be ok with this.   ----- Message -----  From: Ella Farooq APRN  Sent: 1/11/2021   4:01 PM EST  To: Nu Duncan RN, Fay Mazariegos  Subject: RE: Tecfidera/Vumerity switch                    I am ok with switching her to Vumerity.   What do we need to do?     ----- Message -----  From: Fay Mazariegos  Sent: 1/11/2021   2:15 PM EST  To: Stefano Panda MD, Nu Duncan RN, #  Subject: Tecfidera/Vumerity switch                        We have finally been able to determine copays for generic Tecfidera for the 2021 year for many of our patients. Ms. Ashford still has a high copay, and the foundations are closed. Several were briefly open last week and I was able to enroll about a dozen patients, but not Ms. Ashford. Obviously, since you all are the clinical side and the ones responsible for her care, it is definitely your call, but she would be a patient I would recommend switching to Vumerity. Just a heads up.

## 2021-01-21 ENCOUNTER — TELEPHONE (OUTPATIENT)
Dept: NEUROLOGY | Facility: CLINIC | Age: 41
End: 2021-01-21

## 2021-01-21 NOTE — TELEPHONE ENCOUNTER
PT CALLED IN TODAY STATING HER VUMERITY HAS BEEN DENIED THROUGH HER [employer] [facility]. SHE STATES SHE HASN'T HEARD ANYTHING AS TO WHETHER OR NOT AN ALTERNATE MEDICATION IS GOING TO BE CALLED IN TO HER PHARMACY. PT'S PHARMACY VERIFIED AS [facility] IN [place]. PLEASE REVIEW AND ADVISE PT OF AN UPDATE.       CALL BACK- [phone]

## 2021-02-16 ENCOUNTER — SPECIALTY PHARMACY (OUTPATIENT)
Dept: ONCOLOGY | Facility: HOSPITAL | Age: 41
End: 2021-02-16

## 2021-02-16 RX ORDER — DIROXIMEL FUMARATE 231 MG/1
462 CAPSULE ORAL 2 TIMES DAILY
Qty: 120 CAPSULE | Refills: 11 | Status: SHIPPED | OUTPATIENT
Start: 2021-02-16 | End: 2022-03-22 | Stop reason: SDUPTHER

## 2021-02-18 NOTE — PROGRESS NOTES
Oral Neurology Medication Teaching        Patient Name/:     Daya Ashford   1980  Oral Neurology Medication Regimen:  Vumerity DR 231mg PO BID x 7 days, then 462mg PO BID thereafter  Date Started Medication: pending acquisition           Initial Teaching Follow Up Comments      Safety      Storage instructions (away from children; away from heat/cold, sunlight, or moisture)       “How are you storing your medications?”, reminders on storage, proper handling (away from children, managing waste, etc.), disposal of medication with D/C or dosage change     Patient counseled on appropriate storage of medication. Store at room temp, away from pets and children. Pt verbalized understanding.       Adherence       patient and/or caregiver on how to take medication, take with/without food, assess their adherence potential, stress importance of adherence, ways to manage adherence (pill boxes, phone reminders, calendars), what to do if miss a dose    “How are you taking your medication?” “How are you remembering to take your medication?”, “How many doses have you missed?”, determine reasons for non-adherence (not remembering, side effects, etc), ways to improve, overadherence? Remind patient of ways to improve/maintain adherence    Patient will be transitioning from Tecfidera to Vumerity. Has been off Tecfidera for about a month. Reviewed plan for Vumerity 231mg by mouth twice a day x 7 days, then 462mg (2 capsules) by mouth twice a day thereafter. Reviewed plan for missed doses. Pt voiced understanding. Discussed importance of compliance. Script in process at [facility] and will be shipped to patient.      Side Effects/Adverse Reactions       patient on potential side effects, s/s, ways to manage, when to call MD/seek help       Determine if patient experiencing side effects, ways to manage  Discussed potential side effects including but not limited to: flushing, N/V/D and abdominal pain. Advised pt to  take non-EC ASA 81mg 30 min prior to dose if flushing problematic. Avoid taking with high-fat/high-calorie foods or alcohol. Discussed potential serious side effects of lymphopenia, liver damage, PML and requirement of M8rgctx labs.  Pt verbalized understanding.      Miscellaneous      Food interactions, DDIs, financial issues Determine if patient started any new medications since being placed on medication (analyze for DDI) No DDIs identified with planned medication list and Vumerity.       Additional Notes: Discussed aforementioned material with patient by phone. All questions and concerns addressed. Provided patient with my contact information and instructed to call if additional questions/concerns should arise.

## 2021-03-10 ENCOUNTER — TELEPHONE (OUTPATIENT)
Dept: NEUROLOGY | Facility: CLINIC | Age: 41
End: 2021-03-10

## 2021-03-10 NOTE — TELEPHONE ENCOUNTER
Spoke with [employer] rep to let them know that patient does not need the titration starter pack sent and just needs to be on the maintenance dosing since she is switching from Tecfidera to Vumerity. Rep verbalized understanding and documented in the computer.    Spoke with patient as well to let her know that I will contact Greg and they may reach out with her again to set up shipment. She verbalized understanding.

## 2021-03-10 NOTE — TELEPHONE ENCOUNTER
PT CALLING TO CHECK STATUS OF APPEAL FOR VUMERITY PA. PT STATES SHE HAS NOT HEARD ANY UPDATE REGARDING THE MATTER AND WANTED TO KEEP ON TOP OF OBTAINING THE MEDICATION IF POSSIBLE. PT ALSO STATED SHE WOULD LIKE TO DISCUSS DOSAGE OF MEDICATION IF APPROVED.    PT CAN BE REACHED AT [phone].    PLEASE REVIEW AND ADVISE.

## 2021-03-10 NOTE — TELEPHONE ENCOUNTER
Per Fay:  PA approved back in January and has been scanned into the patient's chart. She is switching from Tecfidera to Vumerity, so no titration needed. She should also be getting free drug as her copay is expected to be pretty high. [employer] has been trying to reach her to set her up on free drug. If she has any other concerns regarding dosing, etc., she can reach out to Courtney at [phone]. Courtney actually spoke with the patient on 2/16/2021 about the dosing and education (that note is in the chart I think). Otherwise, she should be fine. She just needs to be on the look out for calls from [employer].       Spoke to patient who states she spoke to [employer] and they just wanted her to check with  that this is the correct dose and she does not need to be titrated up. She does not because she is just switching from Vumerity so patient states we just need to send something to [employer] clarifying this before they will send it out.

## 2021-03-15 NOTE — TELEPHONE ENCOUNTER
Spoke with patient. She stated Greg had tried calling her today and she has not called them back yet. I informed her to call them back to see if they are trying to get her set up with the free drug program. Our office was informed that she should qualify for free drug program. Informed patient to call them and call us back if they have any issues or need more from us and I will give them a call. Patient verbalized understanding.

## 2021-03-15 NOTE — TELEPHONE ENCOUNTER
PT HAS CALLED BACK STATING THAT SHE SPOKE WITH [employer] WHO INFORMED HER THAT PA NEEDS TO BE SENT TO [facility].    [facility] CAN BE REACHED AT [phone] OR FAX AT [phone].    PLEASE REVIEW AND ADVISE.

## 2021-03-15 NOTE — TELEPHONE ENCOUNTER
Provider:   Caller: DEWAYNE    Phone Number: [phone]    Reason for Call: BIOGEN STATES HER COPAY WOULD OVER 2000 A MONTH. PT STATES THERE IS NO WAY SHE CAN AFFORD THAT.

## 2021-03-15 NOTE — TELEPHONE ENCOUNTER
Spoke with patient to let her know that I spoke with D[facility] to see what they were needing. They informed me it looks like patient needs to re-enroll in free drug program. Was given a number to have patient call to get enrolled [phone]. Once they get her re-enrolled they should be able to process the script we sent. Patient verbalized understanding and will call the number I gave her.

## 2021-03-18 ENCOUNTER — SPECIALTY PHARMACY (OUTPATIENT)
Dept: ONCOLOGY | Facility: HOSPITAL | Age: 41
End: 2021-03-18

## 2021-03-18 NOTE — TELEPHONE ENCOUNTER
Can you send script for loading dose for Vumerity to her speciality pharmacy? I spoke with them this morning and they did not mention needing that script.

## 2021-03-18 NOTE — TELEPHONE ENCOUNTER
Caller: [facility]  Phone Number: [phone] [facility]  Reason for Call: CLARIFICATION ON VUMERITY PATIENT IS ASKING FOR THE STARTER DOSE. PLEASE REVIEW AND ADVISE.

## 2021-03-18 NOTE — TELEPHONE ENCOUNTER
PT CALLED REQUESTING THE STARTER DOSE FOR VUMERITY RX BE SENT TO PREFERRED PHARMACY. INFORMED HER THAT PHARMACY HAD BEEN IN TOUCH WITH OUR OFFICE THIS MORNING, IN REGARDS TO MEDICATION AS WELL.    PT CAN BE REACHED AT [phone].    PLEASE REVIEW AND ADVISE.

## 2021-05-24 ENCOUNTER — OFFICE VISIT (OUTPATIENT)
Dept: NEUROLOGY | Facility: CLINIC | Age: 41
End: 2021-05-24

## 2021-05-24 ENCOUNTER — LAB (OUTPATIENT)
Dept: LAB | Facility: HOSPITAL | Age: 41
End: 2021-05-24

## 2021-05-24 VITALS
WEIGHT: 176 LBS | SYSTOLIC BLOOD PRESSURE: 120 MMHG | HEART RATE: 93 BPM | HEIGHT: 66 IN | OXYGEN SATURATION: 98 % | BODY MASS INDEX: 28.28 KG/M2 | DIASTOLIC BLOOD PRESSURE: 76 MMHG

## 2021-05-24 DIAGNOSIS — M79.2 NEUROPATHIC PAIN: Primary | Chronic | ICD-10-CM

## 2021-05-24 DIAGNOSIS — G35 MULTIPLE SCLEROSIS (HCC): Chronic | ICD-10-CM

## 2021-05-24 DIAGNOSIS — R25.2 SPASTIC: Chronic | ICD-10-CM

## 2021-05-24 DIAGNOSIS — G35 MULTIPLE SCLEROSIS (HCC): ICD-10-CM

## 2021-05-24 LAB
ALBUMIN SERPL-MCNC: 4.2 G/DL (ref 3.5–5.2)
ALBUMIN/GLOB SERPL: 1.3 G/DL
ALP SERPL-CCNC: 57 U/L (ref 39–117)
ALT SERPL W P-5'-P-CCNC: 14 U/L (ref 1–33)
ANION GAP SERPL CALCULATED.3IONS-SCNC: 9.2 MMOL/L (ref 5–15)
AST SERPL-CCNC: 15 U/L (ref 1–32)
BASOPHILS # BLD AUTO: 0.06 10*3/MM3 (ref 0–0.2)
BASOPHILS NFR BLD AUTO: 0.8 % (ref 0–1.5)
BILIRUB SERPL-MCNC: 0.3 MG/DL (ref 0–1.2)
BUN SERPL-MCNC: 13 MG/DL (ref 6–20)
BUN/CREAT SERPL: 23.2 (ref 7–25)
CALCIUM SPEC-SCNC: 9.7 MG/DL (ref 8.6–10.5)
CHLORIDE SERPL-SCNC: 103 MMOL/L (ref 98–107)
CO2 SERPL-SCNC: 25.8 MMOL/L (ref 22–29)
CREAT SERPL-MCNC: 0.56 MG/DL (ref 0.57–1)
DEPRECATED RDW RBC AUTO: 43.4 FL (ref 37–54)
EOSINOPHIL # BLD AUTO: 0.22 10*3/MM3 (ref 0–0.4)
EOSINOPHIL NFR BLD AUTO: 2.9 % (ref 0.3–6.2)
ERYTHROCYTE [DISTWIDTH] IN BLOOD BY AUTOMATED COUNT: 13.5 % (ref 12.3–15.4)
GFR SERPL CREATININE-BSD FRML MDRD: 119 ML/MIN/1.73
GLOBULIN UR ELPH-MCNC: 3.2 GM/DL
GLUCOSE SERPL-MCNC: 83 MG/DL (ref 65–99)
HCT VFR BLD AUTO: 45 % (ref 34–46.6)
HGB BLD-MCNC: 14.6 G/DL (ref 12–15.9)
IMM GRANULOCYTES # BLD AUTO: 0.05 10*3/MM3 (ref 0–0.05)
IMM GRANULOCYTES NFR BLD AUTO: 0.7 % (ref 0–0.5)
LYMPHOCYTES # BLD AUTO: 1.9 10*3/MM3 (ref 0.7–3.1)
LYMPHOCYTES NFR BLD AUTO: 24.9 % (ref 19.6–45.3)
MCH RBC QN AUTO: 28.3 PG (ref 26.6–33)
MCHC RBC AUTO-ENTMCNC: 32.4 G/DL (ref 31.5–35.7)
MCV RBC AUTO: 87.2 FL (ref 79–97)
MONOCYTES # BLD AUTO: 0.49 10*3/MM3 (ref 0.1–0.9)
MONOCYTES NFR BLD AUTO: 6.4 % (ref 5–12)
NEUTROPHILS NFR BLD AUTO: 4.91 10*3/MM3 (ref 1.7–7)
NEUTROPHILS NFR BLD AUTO: 64.3 % (ref 42.7–76)
NRBC BLD AUTO-RTO: 0 /100 WBC (ref 0–0.2)
PLATELET # BLD AUTO: 228 10*3/MM3 (ref 140–450)
PMV BLD AUTO: 10.2 FL (ref 6–12)
POTASSIUM SERPL-SCNC: 4.8 MMOL/L (ref 3.5–5.2)
PROT SERPL-MCNC: 7.4 G/DL (ref 6–8.5)
RBC # BLD AUTO: 5.16 10*6/MM3 (ref 3.77–5.28)
SODIUM SERPL-SCNC: 138 MMOL/L (ref 136–145)
WBC # BLD AUTO: 7.63 10*3/MM3 (ref 3.4–10.8)

## 2021-05-24 PROCEDURE — 99214 OFFICE O/P EST MOD 30 MIN: CPT | Performed by: NURSE PRACTITIONER

## 2021-05-24 PROCEDURE — 36415 COLL VENOUS BLD VENIPUNCTURE: CPT

## 2021-05-24 PROCEDURE — 80053 COMPREHEN METABOLIC PANEL: CPT

## 2021-05-24 PROCEDURE — 85025 COMPLETE CBC W/AUTO DIFF WBC: CPT

## 2021-05-24 RX ORDER — CYCLOBENZAPRINE HCL 5 MG
5 TABLET ORAL 3 TIMES DAILY PRN
COMMUNITY
End: 2021-10-15 | Stop reason: DRUGHIGH

## 2021-05-24 NOTE — PROGRESS NOTES
Subjective:     Patient ID: Daya Ashford is a 40 y.o. female.    CC:   Chief Complaint   Patient presents with   • Multiple Sclerosis       HPI:   History of Present Illness   40 y.o. female returns in follow up for RRMS, and spasticity. At last appointment on 11/23/20 continued Tecfidera, GBP, Baclofen, Zanaflex, ordered labs.     Labs 11/23/20: CBC, CMP, UDS - NCS     MRI Brain and cervical spine, 6/9/20  moderate T2 lesion load without new, enlarging, or enhancing lesions. No disc for independent review.      JCV 1/23/17 1.2      RRMS     Started on Vumerity, having some flushing and abdominal cramping. Has not been taking the aspirin every day. Has not been eating prior to taking the medication.      Gait is spastic and unsteady.  Taking Baclofen TID, Flexeril TID PRN, and Tizanidine 8mg PO QHS.     1 fall in the past 6 months, fell running across the road.      ST memory, attention/concentraion difficulties .      Legs and hands will spasm with cooler weather and improve with warmer weather.     Warm weather causes blurry vision and worsening of gait.      Neuropathic pain      Sharp shooting low back and leg pain stable.       mg PO TID.      Family has CMT.      PMH:     Transferred to Dr Almaguer for RRMS.  Sx started in 2007 loss of vision in right eye for several days.  2008 left hemisensory loss for two weeks in October, then in December 2008 right sided numbness.  Dx 2009 and started on Rebif but had frequent episodes of numbness from waist down.  Swtiched to Tysabri for 6 months.  Then changed to Rebif for a year.  Back on Tysabri for 6 months.  Then back to Rebif for approx 6 years.      UTI and appendicitis in 2014 with appendectomy and since has had frequent UTI's and neurogenic bladders     Progressive decrease in gait and hand dexterity on Rebif.     Dr Almaguer' notes:  Sx of muscle spasms, back pain, urinary retention and frequent UTI's.  Currently taking Rebif after taking Tysabri twice  "and stopped due to JCV positive ab.  Dx 10/2007 optic neuritis.  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    Past Medical History:   Diagnosis Date   • MS (multiple sclerosis) (CMS/[facility])        History reviewed. No pertinent surgical history.    Social History     Socioeconomic History   • Marital status: Single     Spouse name: Not on file   • Number of children: Not on file   • Years of education: Not on file   • Highest education level: Not on file   Tobacco Use   • Smoking status: Current Every Day Smoker     Packs/day: 0.50   • Smokeless tobacco: Never Used   Vaping Use   • Vaping Use: Never used   Substance and Sexual Activity   • Alcohol use: No   • Drug use: Never   • Sexual activity: Defer       Family History   Problem Relation Age of Onset   • Mental illness Mother    • Mental illness Other    • Alcohol abuse Other    • Cancer Other    • Diabetes Other         Objective:  /76   Pulse 93   Ht 167.6 cm (65.98\")   Wt 79.8 kg (176 lb)   SpO2 98%   BMI 28.42 kg/m²     Neurologic Exam     Mental Status   Oriented to person, place, and time.   Follows 3 step commands.   Attention: normal. Concentration: normal.   Speech: speech is normal   Level of consciousness: alert  Knowledge: good and consistent with education.   Able to name object. Able to read. Able to repeat. Able to write. Normal comprehension.     Cranial Nerves     CN II   Visual fields full to confrontation.   Visual acuity: normal  Right visual field deficit: none  Left visual field deficit: none     CN III, IV, VI   Pupils are equal, round, and reactive to " light.  Extraocular motions are normal.   Right pupil: Shape: regular. Reactivity: brisk. Consensual response: intact.   Left pupil: Shape: regular. Reactivity: brisk. Consensual response: intact.   Nystagmus: none   Diplopia: none  Ophthalmoparesis: none  Upgaze: normal  Downgaze: normal  Conjugate gaze: present  Vestibulo-ocular reflex: present    CN V   Facial sensation intact.   Right corneal reflex: normal  Left corneal reflex: normal    CN VII   Right facial weakness: none  Left facial weakness: none    CN VIII   Hearing: intact    CN IX, X   Palate: symmetric  Right gag reflex: normal  Left gag reflex: normal    CN XI   Right sternocleidomastoid strength: normal  Left sternocleidomastoid strength: normal    CN XII   Tongue: not atrophic  Fasciculations: absent  Tongue deviation: none    Motor Exam   Muscle bulk: normal  Overall muscle tone: normal  Right arm tone: normal  Left arm tone: normal  Right leg tone: spastic  Left leg tone: spastic    Strength   Strength 5/5 except as noted.   Left quadriceps: 4/5  Left hamstrin/5  Left glutei: 4/5  Left anterior tibial: 4/5  Left posterior tibial: 4/5  Left peroneal: 4/5  Left gastroc: 4/5    Sensory Exam   Light touch normal.   Proprioception normal.     Gait, Coordination, and Reflexes     Gait  Gait: circumduction and spastic    Coordination   Romberg: positive  Finger to nose coordination: normal  Tandem walking coordination: abnormal    Tremor   Resting tremor: absent  Intention tremor: absent  Action tremor: absent    Reflexes   Reflexes 2+ except as noted.   Right patellar: 3+  Left patellar: 3+  Right achilles: 3+  Left achilles: 3+      Physical Exam  Eyes:      Extraocular Movements: EOM normal.      Pupils: Pupils are equal, round, and reactive to light.   Neurological:      Mental Status: She is oriented to person, place, and time.      Coordination: Romberg Test abnormal. Finger-Nose-Finger Test normal.      Gait: Tandem walk abnormal.      Deep  Tendon Reflexes:      Reflex Scores:       Patellar reflexes are 3+ on the right side and 3+ on the left side.       Achilles reflexes are 3+ on the right side and 3+ on the left side.  Psychiatric:         Speech: Speech normal.         Assessment/Plan:       Diagnoses and all orders for this visit:    1. Neuropathic pain (Primary)  Assessment & Plan:  Stable on  mg PO TID       2. Multiple sclerosis (CMS/HCC)  Assessment & Plan:  Stable on Tecfidera     Ordered labs and MRI's         Orders:  -     CBC & Differential; Future  -     Comprehensive Metabolic Panel; Future  -     MRI Brain With & Without Contrast; Future  -     MRI Cervical Spine With & Without Contrast; Future    3. Spastic  Assessment & Plan:  Stable on Zanaflex, Flexeril, and Baclofen              Reviewed medications, potential side effects and signs and symptoms to report. Discussed risk versus benefits of treatment plan with patient and/or family-including medications, labs and radiology that may be ordered. Addressed questions and concerns during visit. Patient and/or family verbalized understanding and agree with plan. Patient instructed to call the office with questions or concerns and report to ED with life-threatening symptoms.     AS THE PROVIDER, I PERSONALLY WORE PPE DURING ENTIRE FACE TO FACE ENCOUNTER IN CLINIC WITH THE PATIENT. PATIENT ALSO WORE PPE DURING ENTIRE FACE TO FACE ENCOUNTER EXCEPT FOR A MAX OF 30 SECONDS DURING NEUROLOGICAL EVALUATION OF CRANIAL NERVES AND THEN MASK WAS PLACED BACK OVER PATIENT FACE FOR REMAINDER OF VISIT. I WASHED MY HANDS BEFORE AND AFTER VISIT.    During this visit the following were done:  Labs Reviewed []    Labs Ordered [x]    Radiology Reports Reviewed []    Radiology Ordered [x]    PCP Records Reviewed []    Referring Provider Records Reviewed []    ER Records Reviewed []    Hospital Records Reviewed []    History Obtained From Family []    Radiology Images Reviewed []    Other Reviewed []     Records Requested []      Return in about 6 weeks (around 7/5/2021).      Ella Farooq, APRN  5/24/2021

## 2021-06-04 DIAGNOSIS — R25.2 SPASTIC: ICD-10-CM

## 2021-06-04 DIAGNOSIS — M79.2 NEUROPATHIC PAIN: ICD-10-CM

## 2021-06-07 RX ORDER — TIZANIDINE 4 MG/1
8 TABLET ORAL
Qty: 60 TABLET | Refills: 5 | Status: SHIPPED | OUTPATIENT
Start: 2021-06-07 | End: 2021-10-15 | Stop reason: SDUPTHER

## 2021-06-07 RX ORDER — CYCLOBENZAPRINE HCL 10 MG
TABLET ORAL
Qty: 60 TABLET | Refills: 11 | Status: SHIPPED | OUTPATIENT
Start: 2021-06-07 | End: 2021-07-07 | Stop reason: SDUPTHER

## 2021-06-07 RX ORDER — BACLOFEN 10 MG/1
TABLET ORAL
Qty: 90 TABLET | Refills: 5 | Status: SHIPPED | OUTPATIENT
Start: 2021-06-07 | End: 2021-10-15 | Stop reason: SDUPTHER

## 2021-06-07 RX ORDER — GABAPENTIN 600 MG/1
TABLET ORAL
Qty: 90 TABLET | Refills: 3 | Status: SHIPPED | OUTPATIENT
Start: 2021-06-07 | End: 2021-09-27

## 2021-06-07 NOTE — TELEPHONE ENCOUNTER
Baclofen  Follow up: 7/7/2021  Last Filled: 11/23/2020 with 5 refills      Tizanidine  Follow up: 7/7/2021  Last filled:11/23/2020 with 5 refills

## 2021-06-07 NOTE — TELEPHONE ENCOUNTER
GBP  Follow up: 7/8/2021  Last filled: 12/17/2020 with 5 refills    Cyclobenzaprine  Follow up:7/8/2021  Last filled: 4/13/2020 with 11 refills

## 2021-06-07 NOTE — TELEPHONE ENCOUNTER
DEWAYNE NARAYAN  [phone]    THE PT CALLED IN BECAUSE SHE IS NEEDING THESE MEDICATIONS REFILL ASAP BECAUSE SHE IS GOING OUT OF TOWN EARLY IN THE MORNING. PLEASE GET THESE SENT OVER TO HER PHARMACY ASAP.

## 2021-06-17 RX ORDER — CYCLOBENZAPRINE HCL 10 MG
TABLET ORAL
Qty: 60 TABLET | Refills: 11 | OUTPATIENT
Start: 2021-06-17

## 2021-06-17 NOTE — TELEPHONE ENCOUNTER
Flexeril  Follow up:7/7/2021  Last filled:6/7/2021 with 11 refills    This has already been sent in by Paula last week for a yrs supply. Thanks!

## 2021-07-07 ENCOUNTER — OFFICE VISIT (OUTPATIENT)
Dept: NEUROLOGY | Facility: CLINIC | Age: 41
End: 2021-07-07

## 2021-07-07 ENCOUNTER — LAB (OUTPATIENT)
Dept: LAB | Facility: HOSPITAL | Age: 41
End: 2021-07-07

## 2021-07-07 VITALS
SYSTOLIC BLOOD PRESSURE: 118 MMHG | HEIGHT: 66 IN | BODY MASS INDEX: 27.8 KG/M2 | OXYGEN SATURATION: 97 % | WEIGHT: 173 LBS | DIASTOLIC BLOOD PRESSURE: 78 MMHG | HEART RATE: 99 BPM

## 2021-07-07 DIAGNOSIS — G35 MULTIPLE SCLEROSIS (HCC): ICD-10-CM

## 2021-07-07 DIAGNOSIS — R11.0 NAUSEA: ICD-10-CM

## 2021-07-07 DIAGNOSIS — M79.2 NEUROPATHIC PAIN: Primary | ICD-10-CM

## 2021-07-07 DIAGNOSIS — R25.2 SPASTIC: ICD-10-CM

## 2021-07-07 PROCEDURE — 84703 CHORIONIC GONADOTROPIN ASSAY: CPT

## 2021-07-07 PROCEDURE — 36415 COLL VENOUS BLD VENIPUNCTURE: CPT

## 2021-07-07 PROCEDURE — 99214 OFFICE O/P EST MOD 30 MIN: CPT | Performed by: NURSE PRACTITIONER

## 2021-07-07 RX ORDER — FAMOTIDINE 20 MG/1
20 TABLET, FILM COATED ORAL 2 TIMES DAILY
Qty: 60 TABLET | Refills: 1 | Status: SHIPPED | OUTPATIENT
Start: 2021-07-07 | End: 2021-07-07

## 2021-07-07 NOTE — PROGRESS NOTES
Subjective:     Patient ID: Daya Ashford is a 41 y.o. female.    CC:   Chief Complaint   Patient presents with   • Peripheral Neuropathy       HPI:   History of Present Illness   40 y.o. female returns in follow up for RRMS, and spasticity. At last appointment on 5/24/21 continued Vumerity GBP, Baclofen, Zanaflex, ordered labs and MRI's.      Labs 5/24/21: CBC, CMP, - NCS Absolute lymphocytes 1900     MRI Brain/Cervical 6/4/21 as compared to 6/9/20, moderate T2 lesion load without new, enlarging, or enhancing lesions. C5/6 cervical spondylosis and central narrowing of C4/5 canal. No change from the prior exam.        JCV 1/23/17 1.2     RRMS     Started on Vumerity, having some flushing and abdominal cramping and nausea. Has not been taking the aspirin every day. Has not been eating prior to taking the medication.     Has been getting car sick and nauseated. Has also missed a period this last month. She took a pregnancy test at home and it was negative, states she didn't know she was pregnat     Gait is spastic and unsteady.  Taking Baclofen TID, Flexeril TID PRN, and Tizanidine 8mg PO QHS.     No further falls      ST memory, attention/concentraion difficulties .      Legs and hands will spasm with cooler weather and improve with warmer weather.      Warm weather causes blurry vision and worsening of gait. Stays out in the heat during the day at the pool.     Has not completed PT/OT/SLP for several years.      Neuropathic pain      Sharp shooting low back and leg pain stable.       mg PO TID.      Family has CMT.      PMH:     Transferred to Dr Almaguer for RRMS.  Sx started in 2007 loss of vision in right eye for several days.  2008 left hemisensory loss for two weeks in October, then in December 2008 right sided numbness.  Dx 2009 and started on Rebif but had frequent episodes of numbness from waist down.  Swtiched to Tysabri for 6 months.  Then changed to Rebif for a year.  Back on Tysabri for 6 months.  " Then back to Rebif for approx 6 years.      UTI and appendicitis in 2014 with appendectomy and since has had frequent UTI's and neurogenic bladders     Progressive decrease in gait and hand dexterity on Rebif.     Dr Almaguer' notes:  Sx of muscle spasms, back pain, urinary retention and frequent UTI's.  Currently taking Rebif after taking Tysabri twice and stopped due to JCV positive ab.  Dx 10/2007 optic neuritis.  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    Past Medical History:   Diagnosis Date   • MS (multiple sclerosis) (CMS/[facility])        History reviewed. No pertinent surgical history.    Social History     Socioeconomic History   • Marital status: Single     Spouse name: Not on file   • Number of children: Not on file   • Years of education: Not on file   • Highest education level: Not on file   Tobacco Use   • Smoking status: Current Every Day Smoker     Packs/day: 0.50   • Smokeless tobacco: Never Used   Vaping Use   • Vaping Use: Never used   Substance and Sexual Activity   • Alcohol use: No   • Drug use: Never   • Sexual activity: Defer       Family History   Problem Relation Age of Onset   • Mental illness Mother    • Mental illness Other    • Alcohol abuse Other    • Cancer Other    • Diabetes Other         Objective:  /78   Pulse 99   Ht 167.6 cm (65.98\")   Wt 78.5 kg (173 lb)   SpO2 97%   BMI 27.94 kg/m²     Neurologic Exam     Mental Status   Oriented to person, place, and time.   Follows 3 step commands.   Attention: normal. Concentration: normal.   Speech: speech is normal   Level of consciousness: alert  Knowledge: good and " consistent with education.   Able to name object. Able to read. Able to repeat. Able to write. Normal comprehension.     Cranial Nerves     CN II   Visual fields full to confrontation.   Visual acuity: normal  Right visual field deficit: none  Left visual field deficit: none     CN III, IV, VI   Pupils are equal, round, and reactive to light.  Extraocular motions are normal.   Right pupil: Shape: regular. Reactivity: brisk. Consensual response: intact.   Left pupil: Shape: regular. Reactivity: brisk. Consensual response: intact.   Nystagmus: none   Diplopia: none  Ophthalmoparesis: none  Upgaze: normal  Downgaze: normal  Conjugate gaze: present  Vestibulo-ocular reflex: present    CN V   Facial sensation intact.   Right corneal reflex: normal  Left corneal reflex: normal    CN VII   Right facial weakness: none  Left facial weakness: none    CN VIII   Hearing: intact    CN IX, X   Palate: symmetric  Right gag reflex: normal  Left gag reflex: normal    CN XI   Right sternocleidomastoid strength: normal  Left sternocleidomastoid strength: normal    CN XII   Tongue: not atrophic  Fasciculations: absent  Tongue deviation: none    Motor Exam   Muscle bulk: normal  Overall muscle tone: increased  Right arm tone: normal  Left arm tone: normal  Right leg tone: spastic  Left leg tone: spastic    Strength   Strength 5/5 except as noted.   Left quadriceps: 4/5  Left hamstrin/5  Left glutei: 4/5  Left anterior tibial: 4/5  Left posterior tibial: 4/5  Left peroneal: 4/5  Left gastroc: 4/5    Sensory Exam   Light touch normal.     Gait, Coordination, and Reflexes     Gait  Gait: normal    Coordination   Finger to nose coordination: normal    Tremor   Resting tremor: absent  Intention tremor: absent  Action tremor: absent    Reflexes   Reflexes 2+ except as noted.   Right patellar: 3+  Left patellar: 3+  Right achilles: 3+  Left achilles: 3+      Physical Exam  Eyes:      Extraocular Movements: EOM normal.      Pupils: Pupils  are equal, round, and reactive to light.   Neurological:      Mental Status: She is oriented to person, place, and time.      Coordination: Finger-Nose-Finger Test normal.      Gait: Gait is intact.      Deep Tendon Reflexes:      Reflex Scores:       Patellar reflexes are 3+ on the right side and 3+ on the left side.       Achilles reflexes are 3+ on the right side and 3+ on the left side.  Psychiatric:         Speech: Speech normal.         Assessment/Plan:       Diagnoses and all orders for this visit:    1. Neuropathic pain (Primary)  Assessment & Plan:  Stable on GBP       2. Multiple sclerosis (CMS/HCC)  Assessment & Plan:  No new or worsening symptoms     Continue Vumerity     Labs and MRI's UTD     Start Pepcid 20 mg PO BID 30 minutes prior to Vumerity dosage to assist with abdominal pain     Ordered HCG to R/O pregnancy     Orders:  -     famotidine (PEPCID) 20 MG tablet; Take 1 tablet by mouth 2 (Two) Times a Day.  Dispense: 60 tablet; Refill: 1    3. Nausea  -     hCG, Serum, Qualitative; Future    4. Spastic  Assessment & Plan:  Continues on Baclofen, Flexeril, and Zanaflex              Reviewed medications, potential side effects and signs and symptoms to report. Discussed risk versus benefits of treatment plan with patient and/or family-including medications, labs and radiology that may be ordered. Addressed questions and concerns during visit. Patient and/or family verbalized understanding and agree with plan. Patient instructed to call the office with questions or concerns and report to ED with life-threatening symptoms.     AS THE PROVIDER, I PERSONALLY WORE PPE DURING ENTIRE FACE TO FACE ENCOUNTER IN CLINIC WITH THE PATIENT. PATIENT ALSO WORE PPE DURING ENTIRE FACE TO FACE ENCOUNTER EXCEPT FOR A MAX OF 30 SECONDS DURING NEUROLOGICAL EVALUATION OF CRANIAL NERVES AND THEN MASK WAS PLACED BACK OVER PATIENT FACE FOR REMAINDER OF VISIT. I WASHED MY HANDS BEFORE AND AFTER VISIT.    During this visit the  following were done:  Labs Reviewed []    Labs Ordered [x]    Radiology Reports Reviewed [x]    Radiology Ordered []    PCP Records Reviewed []    Referring Provider Records Reviewed []    ER Records Reviewed []    Hospital Records Reviewed []    History Obtained From Family []    Radiology Images Reviewed [x]    Other Reviewed []    Records Requested []      Return in about 3 months (around 10/7/2021).      Ella Farooq, AARON  7/8/2021

## 2021-07-07 NOTE — TELEPHONE ENCOUNTER
Patient was seen in clinic today and pharmacy needs new scripts for both flexeril and famotidine. They had the flexeril marked as D/C'd by mistake

## 2021-07-08 ENCOUNTER — TELEPHONE (OUTPATIENT)
Dept: NEUROLOGY | Facility: CLINIC | Age: 41
End: 2021-07-08

## 2021-07-08 LAB — HCG SERPL QL: NEGATIVE

## 2021-07-08 RX ORDER — FAMOTIDINE 20 MG/1
TABLET, FILM COATED ORAL
Qty: 180 TABLET | Refills: 3 | Status: SHIPPED | OUTPATIENT
Start: 2021-07-08 | End: 2022-09-16 | Stop reason: SDUPTHER

## 2021-07-08 RX ORDER — CYCLOBENZAPRINE HCL 10 MG
10 TABLET ORAL 2 TIMES DAILY PRN
Qty: 180 TABLET | Refills: 3 | Status: SHIPPED | OUTPATIENT
Start: 2021-07-08 | End: 2022-08-01 | Stop reason: SDUPTHER

## 2021-07-08 NOTE — TELEPHONE ENCOUNTER
----- Message from AARON Wood sent at 7/8/2021  9:09 AM EDT -----  Please let Daya know that her HCG was negative and she is not pregnant.   Thanks!

## 2021-07-08 NOTE — ASSESSMENT & PLAN NOTE
No new or worsening symptoms     Continue Vumerity     Labs and MRI's UTD     Start Pepcid 20 mg PO BID 30 minutes prior to Vumerity dosage to assist with abdominal pain     Ordered HCG to R/O pregnancy

## 2021-09-27 DIAGNOSIS — M79.2 NEUROPATHIC PAIN: ICD-10-CM

## 2021-09-27 RX ORDER — GABAPENTIN 600 MG/1
TABLET ORAL
Qty: 90 TABLET | Refills: 3 | Status: SHIPPED | OUTPATIENT
Start: 2021-09-27 | End: 2021-10-15 | Stop reason: SDUPTHER

## 2021-10-15 ENCOUNTER — OFFICE VISIT (OUTPATIENT)
Dept: NEUROLOGY | Facility: CLINIC | Age: 41
End: 2021-10-15

## 2021-10-15 ENCOUNTER — LAB (OUTPATIENT)
Dept: LAB | Facility: HOSPITAL | Age: 41
End: 2021-10-15

## 2021-10-15 VITALS
TEMPERATURE: 98 F | DIASTOLIC BLOOD PRESSURE: 76 MMHG | HEART RATE: 87 BPM | WEIGHT: 170 LBS | BODY MASS INDEX: 27.32 KG/M2 | HEIGHT: 66 IN | SYSTOLIC BLOOD PRESSURE: 114 MMHG | OXYGEN SATURATION: 100 %

## 2021-10-15 DIAGNOSIS — R25.2 SPASTIC: Chronic | ICD-10-CM

## 2021-10-15 DIAGNOSIS — G35 MULTIPLE SCLEROSIS (HCC): ICD-10-CM

## 2021-10-15 DIAGNOSIS — M79.2 NEUROPATHIC PAIN: Chronic | ICD-10-CM

## 2021-10-15 DIAGNOSIS — G35 MULTIPLE SCLEROSIS (HCC): Primary | Chronic | ICD-10-CM

## 2021-10-15 LAB
ALBUMIN SERPL-MCNC: 4.6 G/DL (ref 3.5–5.2)
ALBUMIN/GLOB SERPL: 1.6 G/DL
ALP SERPL-CCNC: 49 U/L (ref 39–117)
ALT SERPL W P-5'-P-CCNC: 16 U/L (ref 1–33)
ANION GAP SERPL CALCULATED.3IONS-SCNC: 11.6 MMOL/L (ref 5–15)
AST SERPL-CCNC: 16 U/L (ref 1–32)
BASOPHILS # BLD AUTO: 0.08 10*3/MM3 (ref 0–0.2)
BASOPHILS NFR BLD AUTO: 0.9 % (ref 0–1.5)
BILIRUB SERPL-MCNC: 0.2 MG/DL (ref 0–1.2)
BUN SERPL-MCNC: 9 MG/DL (ref 6–20)
BUN/CREAT SERPL: 15.8 (ref 7–25)
CALCIUM SPEC-SCNC: 9.5 MG/DL (ref 8.6–10.5)
CHLORIDE SERPL-SCNC: 104 MMOL/L (ref 98–107)
CO2 SERPL-SCNC: 25.4 MMOL/L (ref 22–29)
CREAT SERPL-MCNC: 0.57 MG/DL (ref 0.57–1)
DEPRECATED RDW RBC AUTO: 38.8 FL (ref 37–54)
EOSINOPHIL # BLD AUTO: 0.17 10*3/MM3 (ref 0–0.4)
EOSINOPHIL NFR BLD AUTO: 2 % (ref 0.3–6.2)
ERYTHROCYTE [DISTWIDTH] IN BLOOD BY AUTOMATED COUNT: 12.4 % (ref 12.3–15.4)
GFR SERPL CREATININE-BSD FRML MDRD: 116 ML/MIN/1.73
GLOBULIN UR ELPH-MCNC: 2.9 GM/DL
GLUCOSE SERPL-MCNC: 81 MG/DL (ref 65–99)
HCT VFR BLD AUTO: 45.7 % (ref 34–46.6)
HGB BLD-MCNC: 14.7 G/DL (ref 12–15.9)
IMM GRANULOCYTES # BLD AUTO: 0.05 10*3/MM3 (ref 0–0.05)
IMM GRANULOCYTES NFR BLD AUTO: 0.6 % (ref 0–0.5)
LYMPHOCYTES # BLD AUTO: 1.38 10*3/MM3 (ref 0.7–3.1)
LYMPHOCYTES NFR BLD AUTO: 16.2 % (ref 19.6–45.3)
MCH RBC QN AUTO: 27.9 PG (ref 26.6–33)
MCHC RBC AUTO-ENTMCNC: 32.2 G/DL (ref 31.5–35.7)
MCV RBC AUTO: 86.7 FL (ref 79–97)
MONOCYTES # BLD AUTO: 0.43 10*3/MM3 (ref 0.1–0.9)
MONOCYTES NFR BLD AUTO: 5.1 % (ref 5–12)
NEUTROPHILS NFR BLD AUTO: 6.4 10*3/MM3 (ref 1.7–7)
NEUTROPHILS NFR BLD AUTO: 75.2 % (ref 42.7–76)
NRBC BLD AUTO-RTO: 0 /100 WBC (ref 0–0.2)
PLATELET # BLD AUTO: 240 10*3/MM3 (ref 140–450)
PMV BLD AUTO: 10.1 FL (ref 6–12)
POTASSIUM SERPL-SCNC: 5 MMOL/L (ref 3.5–5.2)
PROT SERPL-MCNC: 7.5 G/DL (ref 6–8.5)
RBC # BLD AUTO: 5.27 10*6/MM3 (ref 3.77–5.28)
SODIUM SERPL-SCNC: 141 MMOL/L (ref 136–145)
WBC # BLD AUTO: 8.51 10*3/MM3 (ref 3.4–10.8)

## 2021-10-15 PROCEDURE — 85025 COMPLETE CBC W/AUTO DIFF WBC: CPT

## 2021-10-15 PROCEDURE — 36415 COLL VENOUS BLD VENIPUNCTURE: CPT

## 2021-10-15 PROCEDURE — 80053 COMPREHEN METABOLIC PANEL: CPT

## 2021-10-15 PROCEDURE — 99214 OFFICE O/P EST MOD 30 MIN: CPT | Performed by: PSYCHIATRY & NEUROLOGY

## 2021-10-15 RX ORDER — TIZANIDINE 4 MG/1
8 TABLET ORAL
Qty: 180 TABLET | Refills: 3 | Status: SHIPPED | OUTPATIENT
Start: 2021-10-15 | End: 2022-04-15 | Stop reason: SDUPTHER

## 2021-10-15 RX ORDER — BACLOFEN 10 MG/1
10 TABLET ORAL 3 TIMES DAILY
Qty: 270 TABLET | Refills: 3 | Status: SHIPPED | OUTPATIENT
Start: 2021-10-15 | End: 2022-04-15 | Stop reason: SDUPTHER

## 2021-10-15 RX ORDER — GABAPENTIN 600 MG/1
600 TABLET ORAL 3 TIMES DAILY
Qty: 270 TABLET | Refills: 1 | Status: SHIPPED | OUTPATIENT
Start: 2021-10-15 | End: 2022-01-17

## 2021-10-15 NOTE — PROGRESS NOTES
Chief Complaint  Multiple Sclerosis    Chinmay Ashford presents to [facility] NEUROLOGY     History of Present Illness    41 y.o. female returns in follow up.  Last visit on 7/7/21 continued Vumerity, baclofen, zanaflex, GBP and ordered HCG.     CBC,CMP -  NCS 5/24/21     MRI Brain/cervical, my review of films, 6/4/21 as compared to 1/7/19, moderate T2 lesion load without enhancing lesions, C5/6 DDD, no cord lesions.      JCV 1/23/17 1.2      HCG negative     RRMS     Vision is blurry as day progresses.      Two falls since last visit.      MSFC scores are stable     Compliant with Vumerity and tolerating without difficulty.       Gait is spastic and unsteady.       ST memory, attention/concentraion difficulties .      Legs and hands will spasm with cooler weather        Neuropathic pain     Baclofen 10 mg TID, GBP and Zanaflex controlling pain in neck and back.       Family has CMT.      PMH:     Transferred to Dr Almaguer for RRMS.  Sx started in 2007 loss of vision in right eye for several days.  2008 left hemisensory loss for two weeks in October, then in December 2008 right sided numbness.  Dx 2009 and started on Rebif but had frequent episodes of numbness from waist down.  Swtiched to Tysabri for 6 months.  Then changed to Rebif for a year.  Back on Tysabri for 6 months.  Then back to Rebif for approx 6 years.      UTI and appendicitis in 2014 with appendectomy and since has had frequent UTI's and neurogenic bladders     Progressive decrease in gait and hand dexterity on Rebif.     Reviewed Dr Almaguer' notes:     Sx of muscle spasms, back pain, urinary retention and frequent UTI's.  Currently taking Rebif after taking Tysabri twice and stopped due to JCV positive ab.  Dx 10/2007 optic neuritis.  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      Pupils: Pupils are equal, round, and reactive to light.   Neurological:      Mental Status: She is oriented to person, place, and time.      Coordination: Finger-Nose-Finger Test normal.      Gait: Gait is intact.      Deep Tendon Reflexes:      Reflex Scores:       Patellar reflexes are 3+ on the right side and 3+ on the left side.       Achilles reflexes are 3+ on the right side and 3+ on the left side.  Psychiatric:         Speech: Speech normal.          Neurologic Exam     Mental Status   Oriented to person, place, and time.   Follows 3 step commands.   Attention: normal. Concentration: normal.   Speech: speech is normal   Level of consciousness: alert  Knowledge: good and consistent with education.   Able to name object. Able to read. Able to repeat. Able to write. Normal comprehension.     Cranial Nerves     CN II   Visual fields full to confrontation.   Visual acuity: normal  Right visual field deficit: none  Left visual field deficit: none     CN III, IV, VI   Pupils are equal, round, and reactive to light.  Extraocular motions are normal.   Right pupil: Shape: regular. Reactivity: brisk. Consensual response: intact.   Left pupil: Shape: regular. Reactivity: brisk. Consensual response: intact.   Nystagmus: none   Diplopia: none  Ophthalmoparesis: none  Upgaze: normal  Downgaze: normal  Conjugate gaze: present  Vestibulo-ocular reflex: present    CN V   Facial sensation intact.   Right corneal reflex: normal  Left corneal reflex: normal    CN VII   Right facial weakness: none  Left facial weakness: none    CN VIII   Hearing: intact    CN IX, X   Palate: symmetric  Right gag reflex: normal  Left gag reflex: normal    CN XI   Right sternocleidomastoid " strength: normal  Left sternocleidomastoid strength: normal    CN XII   Tongue: not atrophic  Fasciculations: absent  Tongue deviation: none    Motor Exam   Muscle bulk: normal  Overall muscle tone: increased  Right arm tone: normal  Left arm tone: normal  Right leg tone: spastic  Left leg tone: spastic    Strength   Strength 5/5 except as noted.   Left quadriceps: 4/5  Left hamstrin/5  Left glutei: 4/5  Left anterior tibial: 4/5  Left posterior tibial: 4/5  Left peroneal: 4/5  Left gastroc: 4/5    Sensory Exam   Light touch normal.     Gait, Coordination, and Reflexes     Gait  Gait: normal    Coordination   Finger to nose coordination: normal    Tremor   Resting tremor: absent  Intention tremor: absent  Action tremor: absent    Reflexes   Reflexes 2+ except as noted.   Right patellar: 3+  Left patellar: 3+  Right achilles: 3+  Left achilles: 3+     Result Review :   The following data was reviewed by: Stefano Panda MD on 10/15/2021:  Common labs    Common Labsle 20    1548 1548 1335 1335   Glucose  87  83   BUN  16  13   Creatinine  0.81  0.56 (A)   eGFR Non African Am  78  119   Sodium  131 (A)  138   Potassium  3.9  4.8   Chloride  95 (A)  103   Calcium  9.5  9.7   Albumin  4.40  4.20   Total Bilirubin  <0.2  0.3   Alkaline Phosphatase  65  57   AST (SGOT)  13  15   ALT (SGPT)  19  14   WBC 8.50  7.63    Hemoglobin 15.6  14.6    Hematocrit 46.6  45.0    Platelets 226  228    (A) Abnormal value            Data reviewed: Radiologic studies MRI B/C          Assessment and Plan    Diagnoses and all orders for this visit:    1. Multiple sclerosis (HCC) (Primary)  Assessment & Plan:  MSFC stable on Vumerity    CBC,CMP      Orders:  -     CBC & Differential; Future  -     Comprehensive Metabolic Panel; Future    2. Neuropathic pain  Assessment & Plan:  Sx controlled on GBP, Baclofen, Zanaflex     Orders:  -     gabapentin (NEURONTIN) 600 MG tablet; Take 1 tablet by mouth 3 (Three)  Times a Day.  Dispense: 270 tablet; Refill: 1    3. Spastic  -     baclofen (LIORESAL) 10 MG tablet; Take 1 tablet by mouth 3 (Three) Times a Day.  Dispense: 270 tablet; Refill: 3  -     tiZANidine (ZANAFLEX) 4 MG tablet; Take 2 tablets by mouth every night at bedtime.  Dispense: 180 tablet; Refill: 3      Follow Up   No follow-ups on file.  Patient was given instructions and counseling regarding her condition or for health maintenance advice. Please see specific information pulled into the AVS if appropriate.

## 2021-12-17 ENCOUNTER — SPECIALTY PHARMACY (OUTPATIENT)
Dept: ONCOLOGY | Facility: HOSPITAL | Age: 41
End: 2021-12-17

## 2021-12-17 NOTE — PROGRESS NOTES
Specialty Pharmacy Refill Coordination Note     Enrolled patient in MS program. Patient fills Vumerity at [facility]. Plan to f/u in February for refills.                   Follow-up: 2/16/2022     Lashonda Feliciano, Pharmacy Technician  Specialty Pharmacy Technician

## 2022-01-17 DIAGNOSIS — M79.2 NEUROPATHIC PAIN: Chronic | ICD-10-CM

## 2022-01-17 RX ORDER — GABAPENTIN 600 MG/1
TABLET ORAL
Qty: 270 TABLET | Refills: 1 | Status: SHIPPED | OUTPATIENT
Start: 2022-01-17 | End: 2022-01-25 | Stop reason: SDUPTHER

## 2022-01-25 RX ORDER — GABAPENTIN 600 MG/1
600 TABLET ORAL 3 TIMES DAILY
Qty: 270 TABLET | Refills: 1 | Status: SHIPPED | OUTPATIENT
Start: 2022-01-25 | End: 2022-04-15 | Stop reason: SDUPTHER

## 2022-01-25 NOTE — TELEPHONE ENCOUNTER
Caller:DEWAYNE NARAYAN  Relationship:SELF      Requested Prescriptions: gabapentin (NEURONTIN) 600 MG tablet      Requested Prescriptions     Signed Prescriptions Disp Refills   • gabapentin (NEURONTIN) 600 MG tablet 270 tablet 1     Sig: TAKE 1 TABLET BY MOUTH THREE TIMES DAILY     Authorizing Provider: DU QUINTANILLA        Pharmacy where request should be sent: [id number] - [place], [place] - [address] [place] [address] DR - [phone]  - [phone] FX     Additional details provided by patient: PT CALLING STATING THAT THE [facility] IN [place] CALLED ASKED IF SHE WANTED TO GET IT FILLED, I SEE THAT SCRIPT WAS SENT TO THE [facility] IN [place], BUT PT WANTS THE SCRIPT SENT TO [facility] IN [facility]. SHE IS NOT OUT YET BUT HAS ABOUT A WEEK WORTH OF MEDICATION LEFT.     Does the patient have less than a 3 day supply:  [] Yes  [x] No    Anita Bynum Rep   01/25/22 11:00 EST

## 2022-02-14 ENCOUNTER — TELEPHONE (OUTPATIENT)
Dept: NEUROLOGY | Facility: CLINIC | Age: 42
End: 2022-02-14

## 2022-02-14 NOTE — TELEPHONE ENCOUNTER
Caller: ADRIANO    Relationship: W/ [employer]    Best call back number: [phone]    What was the call regarding: ADRIANO CALLED STATING THAT [facility] HAS DENIED COVERAGE OF VUMERITY MEDICATION & REQUESTING A PA BE INITIATED.    Do you require a callback: IF NEEDED.    PLEASE REVIEW AND ADVISE.

## 2022-03-22 ENCOUNTER — SPECIALTY PHARMACY (OUTPATIENT)
Dept: ONCOLOGY | Facility: HOSPITAL | Age: 42
End: 2022-03-22

## 2022-03-22 RX ORDER — DIROXIMEL FUMARATE 231 MG/1
462 CAPSULE ORAL 2 TIMES DAILY
Qty: 120 CAPSULE | Refills: 11 | Status: SHIPPED | OUTPATIENT
Start: 2022-03-22 | End: 2023-03-27 | Stop reason: SDUPTHER

## 2022-04-15 ENCOUNTER — OFFICE VISIT (OUTPATIENT)
Dept: NEUROLOGY | Facility: CLINIC | Age: 42
End: 2022-04-15

## 2022-04-15 ENCOUNTER — LAB (OUTPATIENT)
Dept: LAB | Facility: HOSPITAL | Age: 42
End: 2022-04-15

## 2022-04-15 VITALS
DIASTOLIC BLOOD PRESSURE: 74 MMHG | BODY MASS INDEX: 27.16 KG/M2 | OXYGEN SATURATION: 92 % | HEIGHT: 66 IN | WEIGHT: 169 LBS | SYSTOLIC BLOOD PRESSURE: 110 MMHG | TEMPERATURE: 97.1 F | HEART RATE: 99 BPM

## 2022-04-15 DIAGNOSIS — R26.9 GAIT ABNORMALITY: ICD-10-CM

## 2022-04-15 DIAGNOSIS — M79.2 NEUROPATHIC PAIN: Primary | Chronic | ICD-10-CM

## 2022-04-15 DIAGNOSIS — G35 MULTIPLE SCLEROSIS: Chronic | ICD-10-CM

## 2022-04-15 DIAGNOSIS — G35 MULTIPLE SCLEROSIS: ICD-10-CM

## 2022-04-15 DIAGNOSIS — R25.2 SPASTIC: Chronic | ICD-10-CM

## 2022-04-15 LAB
ALBUMIN SERPL-MCNC: 4.7 G/DL (ref 3.5–5.2)
ALBUMIN/GLOB SERPL: 1.5 G/DL
ALP SERPL-CCNC: 58 U/L (ref 39–117)
ALT SERPL W P-5'-P-CCNC: 12 U/L (ref 1–33)
ANION GAP SERPL CALCULATED.3IONS-SCNC: 10.7 MMOL/L (ref 5–15)
AST SERPL-CCNC: 15 U/L (ref 1–32)
BASOPHILS # BLD AUTO: 0.06 10*3/MM3 (ref 0–0.2)
BASOPHILS NFR BLD AUTO: 0.7 % (ref 0–1.5)
BILIRUB SERPL-MCNC: 0.2 MG/DL (ref 0–1.2)
BUN SERPL-MCNC: 12 MG/DL (ref 6–20)
BUN/CREAT SERPL: 16.7 (ref 7–25)
CALCIUM SPEC-SCNC: 10.4 MG/DL (ref 8.6–10.5)
CHLORIDE SERPL-SCNC: 101 MMOL/L (ref 98–107)
CO2 SERPL-SCNC: 25.3 MMOL/L (ref 22–29)
CREAT SERPL-MCNC: 0.72 MG/DL (ref 0.57–1)
DEPRECATED RDW RBC AUTO: 39.9 FL (ref 37–54)
EGFRCR SERPLBLD CKD-EPI 2021: 106.5 ML/MIN/1.73
EOSINOPHIL # BLD AUTO: 0.13 10*3/MM3 (ref 0–0.4)
EOSINOPHIL NFR BLD AUTO: 1.4 % (ref 0.3–6.2)
ERYTHROCYTE [DISTWIDTH] IN BLOOD BY AUTOMATED COUNT: 12.8 % (ref 12.3–15.4)
GLOBULIN UR ELPH-MCNC: 3.2 GM/DL
GLUCOSE SERPL-MCNC: 85 MG/DL (ref 65–99)
HCT VFR BLD AUTO: 45.8 % (ref 34–46.6)
HGB BLD-MCNC: 15.2 G/DL (ref 12–15.9)
IMM GRANULOCYTES # BLD AUTO: 0.04 10*3/MM3 (ref 0–0.05)
IMM GRANULOCYTES NFR BLD AUTO: 0.4 % (ref 0–0.5)
LYMPHOCYTES # BLD AUTO: 1.1 10*3/MM3 (ref 0.7–3.1)
LYMPHOCYTES NFR BLD AUTO: 12.2 % (ref 19.6–45.3)
MCH RBC QN AUTO: 28.6 PG (ref 26.6–33)
MCHC RBC AUTO-ENTMCNC: 33.2 G/DL (ref 31.5–35.7)
MCV RBC AUTO: 86.3 FL (ref 79–97)
MONOCYTES # BLD AUTO: 0.52 10*3/MM3 (ref 0.1–0.9)
MONOCYTES NFR BLD AUTO: 5.8 % (ref 5–12)
NEUTROPHILS NFR BLD AUTO: 7.19 10*3/MM3 (ref 1.7–7)
NEUTROPHILS NFR BLD AUTO: 79.5 % (ref 42.7–76)
NRBC BLD AUTO-RTO: 0 /100 WBC (ref 0–0.2)
PLATELET # BLD AUTO: 262 10*3/MM3 (ref 140–450)
PMV BLD AUTO: 9.7 FL (ref 6–12)
POTASSIUM SERPL-SCNC: 5.3 MMOL/L (ref 3.5–5.2)
PROT SERPL-MCNC: 7.9 G/DL (ref 6–8.5)
RBC # BLD AUTO: 5.31 10*6/MM3 (ref 3.77–5.28)
SODIUM SERPL-SCNC: 137 MMOL/L (ref 136–145)
WBC NRBC COR # BLD: 9.04 10*3/MM3 (ref 3.4–10.8)

## 2022-04-15 PROCEDURE — 85025 COMPLETE CBC W/AUTO DIFF WBC: CPT

## 2022-04-15 PROCEDURE — 99214 OFFICE O/P EST MOD 30 MIN: CPT | Performed by: PSYCHIATRY & NEUROLOGY

## 2022-04-15 PROCEDURE — 80053 COMPREHEN METABOLIC PANEL: CPT

## 2022-04-15 PROCEDURE — 36415 COLL VENOUS BLD VENIPUNCTURE: CPT

## 2022-04-15 RX ORDER — BACLOFEN 10 MG/1
10 TABLET ORAL 3 TIMES DAILY
Qty: 270 TABLET | Refills: 3 | Status: SHIPPED | OUTPATIENT
Start: 2022-04-15 | End: 2022-09-16 | Stop reason: SDUPTHER

## 2022-04-15 RX ORDER — TIZANIDINE 4 MG/1
8 TABLET ORAL
Qty: 180 TABLET | Refills: 3 | Status: SHIPPED | OUTPATIENT
Start: 2022-04-15 | End: 2022-09-16 | Stop reason: SDUPTHER

## 2022-04-15 RX ORDER — GABAPENTIN 600 MG/1
600 TABLET ORAL 3 TIMES DAILY
Qty: 270 TABLET | Refills: 1 | Status: SHIPPED | OUTPATIENT
Start: 2022-04-15 | End: 2022-07-18 | Stop reason: SDUPTHER

## 2022-04-15 NOTE — PROGRESS NOTES
Chief Complaint    Multiple Sclerosis    Chinmay Ashford presents to [facility] NEUROLOG[facility]     History of Present Illness    41 y.o. female returns in follow up.  Last visit on 10/15/21 continued Vumerity, baclofen, zanaflex, GBP and ordered labs, .     CBC,CMP -  NCS 10/15/21     MRI Brain/cervical 6/4/21 as compared to 1/7/19, moderate T2 lesion load without enhancing lesions, C5/6 DDD, no cord lesions.      JCV 1/23/17 1.2         RRMS     T25FW  9.2 sec     Occasional hot flashes on Vumerity.      Vision is blurry as day progresses stable      One fall since last visit no injury.       MSFC scores are stable      Compliant with Vumerity and tolerating without difficulty.       Gait is spastic and unsteady.       ST memory, attention/concentraion difficulties .      Legs and hands will spasm with cooler weather    Fingers are going numb.       Neuropathic pain      Baclofen 10 mg TID, GBP and Zanaflex controlling pain in neck and back.       Family has CMT.      PMH:     Transferred to Dr Almaguer for RRMS.  Sx started in 2007 loss of vision in right eye for several days.  2008 left hemisensory loss for two weeks in October, then in December 2008 right sided numbness.  Dx 2009 and started on Rebif but had frequent episodes of numbness from waist down.  Swtiched to Tysabri for 6 months.  Then changed to Rebif for a year.  Back on Tysabri for 6 months.  Then back to Rebif for approx 6 years.      UTI and appendicitis in 2014 with appendectomy and since has had frequent UTI's and neurogenic bladders     Progressive decrease in gait and hand dexterity on Rebif.     Reviewed Dr Almaguer' notes:     Sx of muscle spasms, back pain, urinary retention and frequent UTI's.  Currently taking Rebif after taking Tysabri twice and stopped due to JCV positive ab.  Dx 10/2007 optic neuritis.  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      Pupils: Pupils are equal, round, and reactive to light.   Neurological:      Mental Status: She is oriented to person, place, and time.      Coordination: Finger-Nose-Finger Test normal.      Deep Tendon Reflexes:      Reflex Scores:       Patellar reflexes are 3+ on the right side and 3+ on the left side.       Achilles reflexes are 3+ on the right side and 3+ on the left side.  Psychiatric:         Speech: Speech normal.          Neurologic Exam     Mental Status   Oriented to person, place, and time.   Follows 3 step commands.   Attention: normal. Concentration: normal.   Speech: speech is normal   Level of consciousness: alert  Knowledge: good and consistent with education.   Able to name object. Able to read. Able to repeat. Able to write. Normal comprehension.     Cranial Nerves     CN II   Visual fields full to confrontation.   Visual acuity: normal  Right visual field deficit: none  Left visual field deficit: none     CN III, IV, VI   Pupils are equal, round, and reactive to light.  Extraocular motions are normal.   Right pupil: Shape: regular. Reactivity: brisk. Consensual response: intact.   Left pupil: Shape: regular. Reactivity: brisk. Consensual response: intact.   Nystagmus: none   Diplopia: none  Ophthalmoparesis: none  Upgaze: normal  Downgaze: normal  Conjugate gaze: present  Vestibulo-ocular reflex: present    CN V   Facial sensation intact.   Right corneal reflex: normal  Left corneal reflex: normal    CN VII   Right facial weakness: none  Left facial weakness: none    CN VIII   Hearing: intact    CN IX, X   Palate: symmetric  Right gag reflex: normal  Left gag " reflex: normal    CN XI   Right sternocleidomastoid strength: normal  Left sternocleidomastoid strength: normal    CN XII   Tongue: not atrophic  Fasciculations: absent  Tongue deviation: none    Motor Exam   Muscle bulk: normal  Overall muscle tone: increased  Right arm tone: normal  Left arm tone: normal  Right leg tone: spastic  Left leg tone: spastic    Strength   Strength 5/5 except as noted.   Left quadriceps: 4/5  Left hamstrin/5  Left glutei: 4/5  Left anterior tibial: 4/5  Left posterior tibial: 4/5  Left peroneal: 4/5  Left gastroc: 4/5    Sensory Exam   Light touch normal.     Gait, Coordination, and Reflexes     Gait  Gait: circumduction and spastic    Coordination   Finger to nose coordination: normal    Tremor   Resting tremor: absent  Intention tremor: absent  Action tremor: absent    Reflexes   Reflexes 2+ except as noted.   Right patellar: 3+  Left patellar: 3+  Right achilles: 3+  Left achilles: 3+     Result Review :   The following data was reviewed by: Stefano Panda MD on 04/15/2022:  Common labs    Common Labsle 5/24/21 5/24/21 10/15/21 10/15/21    1335 1335 1101 1101   Glucose  83  81   BUN  13  9   Creatinine  0.56 (A)  0.57   eGFR Non African Am  119  116   Sodium  138  141   Potassium  4.8  5.0   Chloride  103  104   Calcium  9.7  9.5   Albumin  4.20  4.60   Total Bilirubin  0.3  0.2   Alkaline Phosphatase  57  49   AST (SGOT)  15  16   ALT (SGPT)  14  16   WBC 7.63  8.51    Hemoglobin 14.6  14.7    Hematocrit 45.0  45.7    Platelets 228  240    (A) Abnormal value                      Assessment and Plan    Diagnoses and all orders for this visit:    1. Neuropathic pain (Primary)  Assessment & Plan:  Sx stable on GBP     Orders:  -     gabapentin (NEURONTIN) 600 MG tablet; Take 1 tablet by mouth 3 (Three) Times a Day.  Dispense: 270 tablet; Refill: 1    2. Multiple sclerosis (HCC)  Assessment & Plan:  Continue Vumerity     CBC,CMP      Orders:  -     Ambulatory Referral to Physical  Therapy Evaluate and treat  -     Comprehensive Metabolic Panel; Future  -     CBC & Differential; Future    3. Spastic  Assessment & Plan:  Continue Baclofen, Zanaflex     Orders:  -     tiZANidine (ZANAFLEX) 4 MG tablet; Take 2 tablets by mouth every night at bedtime.  Dispense: 180 tablet; Refill: 3  -     baclofen (LIORESAL) 10 MG tablet; Take 1 tablet by mouth 3 (Three) Times a Day.  Dispense: 270 tablet; Refill: 3    4. Gait abnormality  Assessment & Plan:  T25FW 9.2 sec    Start Ampyra         Follow Up   No follow-ups on file.  Patient was given instructions and counseling regarding her condition or for health maintenance advice. Please see specific information pulled into the AVS if appropriate.

## 2022-04-22 ENCOUNTER — SPECIALTY PHARMACY (OUTPATIENT)
Dept: ONCOLOGY | Facility: HOSPITAL | Age: 42
End: 2022-04-22

## 2022-04-22 RX ORDER — DALFAMPRIDINE 10 MG/1
10 TABLET, FILM COATED, EXTENDED RELEASE ORAL 2 TIMES DAILY
Qty: 60 TABLET | Refills: 11 | Status: SHIPPED | OUTPATIENT
Start: 2022-04-22 | End: 2022-05-10 | Stop reason: SDUPTHER

## 2022-05-10 RX ORDER — DALFAMPRIDINE 10 MG/1
10 TABLET, FILM COATED, EXTENDED RELEASE ORAL 2 TIMES DAILY
Qty: 60 TABLET | Refills: 11 | Status: SHIPPED | OUTPATIENT
Start: 2022-05-10 | End: 2022-07-01 | Stop reason: SDUPTHER

## 2022-07-01 ENCOUNTER — SPECIALTY PHARMACY (OUTPATIENT)
Dept: ONCOLOGY | Facility: HOSPITAL | Age: 42
End: 2022-07-01

## 2022-07-01 ENCOUNTER — TELEPHONE (OUTPATIENT)
Dept: NEUROLOGY | Facility: CLINIC | Age: 42
End: 2022-07-01

## 2022-07-01 RX ORDER — DALFAMPRIDINE 10 MG/1
10 TABLET, FILM COATED, EXTENDED RELEASE ORAL 2 TIMES DAILY
Qty: 60 TABLET | Refills: 11 | Status: SHIPPED | OUTPATIENT
Start: 2022-07-01 | End: 2022-09-23 | Stop reason: SDUPTHER

## 2022-07-01 NOTE — TELEPHONE ENCOUNTER
Caller: DEWAYNEJEANIE Foy call back number: [phone]      What was the call regarding:  PT SAID SHE REALIZED SHE NEVER HEARD ANYTHING REGARDING RX DALFAMPRIDINE PLEASE CALL AND ADVISE WHAT HAPPENED ? IF SHE WAS EVER APPROVED.      Do you require a callback: YES     PLEASE ADVISE

## 2022-07-01 NOTE — PROGRESS NOTES
Specialty Pharmacy Patient Management Program  Neurology Initial and reassessment     Daya Ashford is a 41 y.o. female with multiple sclerosis seen by a Neurology provider and enrolled in the Neurology Patient Management program offered by [facility].  An initial outreach was conducted, including assessment of therapy appropriateness and specialty medication education for dalfampridine and continuation of Vumerity. The patient was introduced to services offered by [facility], including: regular assessments, refill coordination, curbside pick-up or mail order delivery options, prior authorization maintenance, and financial assistance programs as applicable. The patient was also provided with contact information for the pharmacy team.     Insurance Coverage & Financial Support  Medicare    Relevant Past Medical History and Comorbidities  Relevant medical history and concomitant health conditions were discussed with the patient. The patient's chart has been reviewed for relevant past medical history and comorbid health conditions and updated as necessary.   Past Medical History:   Diagnosis Date   • MS (multiple sclerosis) (HCC)      Social History     Socioeconomic History   • Marital status: Single   Tobacco Use   • Smoking status: Current Every Day Smoker     Packs/day: 0.50   • Smokeless tobacco: Never Used   Vaping Use   • Vaping Use: Never used   Substance and Sexual Activity   • Alcohol use: No   • Drug use: Never   • Sexual activity: Defer       Problem list reviewed by Jeannie Crow, Tom on 7/1/2022 at  2:52 PM    Allergies  Known allergies and reactions were discussed with the patient. The patient's chart has been reviewed for  allergy information and updated as necessary.   Patient has no known allergies.    Allergies reviewed by Jeannie Crow, Tom on 7/1/2022 at  2:33 PM    Current Medication List  This medication list has been reviewed with the  patient and evaluated for any interactions or necessary modifications/recommendations, and updated to include all prescription medications, OTC medications, and supplements the patient is currently taking.  This list reflects what is contained in the patient's profile, which has also been marked as reviewed to communicate to other providers it is the most up to date version of the patient's current medication therapy.     Current Outpatient Medications:   •  baclofen (LIORESAL) 10 MG tablet, Take 1 tablet by mouth 3 (Three) Times a Day., Disp: 270 tablet, Rfl: 3  •  cyclobenzaprine (FLEXERIL) 10 MG tablet, Take 1 tablet by mouth 2 (Two) Times a Day As Needed for Muscle Spasms., Disp: 180 tablet, Rfl: 3  •  Dalfampridine ER 10 MG tablet sustained-release 12 hour, Take 1 tablet by mouth 2 (Two) Times a Day., Disp: 60 tablet, Rfl: 11  •  Diroximel Fumarate (Vumerity) 231 MG capsule delayed-release, Take 2 capsules by mouth 2 (Two) Times a Day., Disp: 120 capsule, Rfl: 11  •  famotidine (PEPCID) 20 MG tablet, TAKE 1 TABLET BY MOUTH TWICE DAILY, Disp: 180 tablet, Rfl: 3  •  gabapentin (NEURONTIN) 600 MG tablet, Take 1 tablet by mouth 3 (Three) Times a Day., Disp: 270 tablet, Rfl: 1  •  tiZANidine (ZANAFLEX) 4 MG tablet, Take 2 tablets by mouth every night at bedtime., Disp: 180 tablet, Rfl: 3    Medicines reviewed by Jeannie Crow, PharmD on 7/1/2022 at  2:52 PM    Drug Interactions  none     Recommended Medications Assessment    None      Relevant Laboratory Values    Common labs    Common Labsle 10/15/21 10/15/21 4/15/22 4/15/22    1101 1101 1015 1015   Glucose  81  85   BUN  9  12   Creatinine  0.57  0.72   eGFR Non African Am  116     Sodium  141  137   Potassium  5.0  5.3 (A)   Chloride  104  101   Calcium  9.5  10.4   Albumin  4.60  4.70   Total Bilirubin  0.2  0.2   Alkaline Phosphatase  49  58   AST (SGOT)  16  15   ALT (SGPT)  16  12   WBC 8.51  9.04    Hemoglobin 14.7  15.2    Hematocrit 45.7  45.8     Platelets 240  262    (A) Abnormal value              Initial Education Provided for Specialty Medication  The patient has been provided with the following education and any applicable administration techniques (i.e. self-injection) have been demonstrated for the therapies indicated. All questions and concerns have been addressed prior to the patient receiving the medication, and the patient has verbalized understanding of the education and any materials provided.  Additional patient education shall be provided and documented upon request by the patient, provider or payer.           Dalfampridine XR 10mg (Ampyra)           Medication Expectations   Why am I taking this medication? To help improve you walking.   What should I expect while on this medication? You should expect to see expect to an improvement in walking speed and balance.   How does the medication work? Beneficial effects for MS appear to be related to improvement of nerve conduction and the frequency response in demyelinated nerve fibers via prolongation of the repolarization phase of the action potential.   How long will I be on this medication for? The amount of time you will be on this medication will be determined by your doctor and your response to the medication.    How do I take this medication? Take Dalfampridine 10 XR po twice daily, swallow whole - do not crush, break or chew.   What are some possible side effects? Potential side effects including, but not limited to: N/V, headache, abdominal pain, insomnia, dizziness, and urinary tract infections. Pt verbalized understanding.   What happens if I miss a dose? Do not take more than 2 tablets in a 24-hour period.  If you miss a dose do not double up, wait until you next regularly scheduled dose instead.                  Medication Safety   What are things I should warn my doctor immediately about? Allergic reaction such as swelling in your face or hands, swelling or tingling in your mouth or  throat, chest tightness, trouble breathing.  Bloody or cloudy urine or change in how often/how much you urinate or seizures.   What are things that I should be cautious of? History of seizures or mild renal impairment (may increase the risk of seizures)   What are some medications that can interact with this one? Bupropion, lacosamide, dolutegravir, trilaciclib.            Medication Storage/Handling   How should I handle this medication? Keep this medication our of reach of pets/children in original container.   How does this medication need to be stored? Store at room temperature away from heat/cold, sunlight or moisture.   How should I dispose of this medication? There should not be a need to dispose of this medication unless your provider decides to change the dose or therapy. If that is the case, take to your local police station for proper disposal. Some pharmacies also have take-back bins for medication drop-off.             Resources/Support   How can I remind myself to take this medication? You can download reminder apps to help you manage your refills. You may also set an alarm on your phone to remind you. The pharmacy carries pill boxes that you can place next to an area you pass everyday (such as where you place your car keys or where you charge your phone)   Is financial support available?  Yes, possible enrollment in PAP for MS or patient assistance if you have Medicare or no insurance.    Which vaccines are recommended for me? Talk to your doctor about these vaccines: Flu, Coronavirus (COVID-19), Pneumococcal (pneumonia), Tdap, Hepatitis B, Zoster (shingles)              Adherence and Self-Administration  • Barriers to Patient Adherence and/or Self-Administration: none    • Methods for Supporting Patient Adherence and/or Self-Administration: none    Medication Adherence    Demonstrates understanding of importance of adherence: yes  Informant: patient  Reliability of informant: reliable  Estimated  medication adherence level: %  Adherence estimation source: claims history  Reasons for non-adherence: no problems identified  Adherence tools used: directed education  Support network for adherence: healthcare provider            Adverse Drug Reactions  • Adverse Reactions Experienced: none  • Plan for ADR Management: not required     Hospitalizations and Urgent Care Since Last Assessment  • Hospitalizations or Admissions: none   • ED Visits: none   • Urgent Office Visits: none       Quality of Life Assessment   Quality of Life related to the patient's specialty therapy was discussed with the patient. The QOL segment of this outreach has been reviewed and updated.     Quality of Life Assessment  Quality of Life Improvement Scale: No change  Comments on Quality of Life: New start dalfampridine. No signs or symptoms of relapsed MS    Goals of Therapy   Goals     • Specialty Pharmacy General Goal      Improved walking speed               Reassessment Plan & Follow-Up  1. Medication Therapy Changes: Start dalfampridine 10 mg po every 12 hours  2. Additional Plans, Therapy Recommendations, or Therapy Problems to Be Addressed:  none   3. Pharmacist to perform regular reassessments no more than (6) months from the previous assessment.  4. Welcome information and patient satisfaction survey to be sent by retail team with patient's initial fill.  5. Care Coordinator to set up future refill outreaches, coordinate prescription delivery, and escalate clinical questions to pharmacist.     Attestation  I attest that the initiated specialty medication(s) are appropriate for the patient based on my assessment.  If the prescribed therapy is at any point deemed not appropriate based on the current or future assessments, a consultation will be initiated with the patient's specialty care provider to determine the best course of action. The revised plan of therapy will be documented along with any additional patient education  provided.     Jeannie Crow, PharmD  7/1/2022  15:04 EDT

## 2022-07-01 NOTE — PROGRESS NOTES
Specialty Pharmacy Refill Coordination Note     Daya is a 41 y.o. female contacted today regarding refills of Dalfampridine specialty medication(s).    Reviewed and verified with patient: Yes  Specialty medication(s) and dose(s) confirmed: yes        Delivery Questions    Flowsheet Row Most Recent Value   Delivery method FedEx   Delivery address correct? Yes   Preferred delivery time? Anytime   Number of medications in delivery 1   Medication being filled and delivered Dalfampridine   Doses left of specialty medications None. New start.   Is there any medication that is due not being filled? No   Supplies needed? No supplies needed   Cooler needed? No   Do any medications need mixed or dated? No   Copay form of payment Credit card on file   Questions or concerns for the pharmacist? No   Are any medications first time fills? Yes  [New start on Dalfampridine]                 Follow-up:  28 days.     Fay Mazariegos, Pharmacy Technician  Specialty Pharmacy Technician

## 2022-07-18 DIAGNOSIS — M79.2 NEUROPATHIC PAIN: Chronic | ICD-10-CM

## 2022-07-18 RX ORDER — GABAPENTIN 600 MG/1
600 TABLET ORAL 3 TIMES DAILY
Qty: 270 TABLET | Refills: 1 | Status: SHIPPED | OUTPATIENT
Start: 2022-07-18 | End: 2022-12-06

## 2022-07-18 NOTE — TELEPHONE ENCOUNTER
Rx Refill Note  Requested Prescriptions     Pending Prescriptions Disp Refills   • gabapentin (NEURONTIN) 600 MG tablet 270 tablet 1     Sig: Take 1 tablet by mouth 3 (Three) Times a Day.      Last filled: 04/15/2022 270 with 1 refill.  Last office visit with prescribing clinician: 4/15/2022      Next office visit with prescribing clinician: 10/17/2022     Shellie Sabillon MA  07/18/22, 13:55 EDT

## 2022-07-18 NOTE — TELEPHONE ENCOUNTER
Caller: DEWAYNE NARAYAN  Relationship: SELF    Best call back number: [phone]    Requested Prescriptions: gabapentin (NEURONTIN) 600 MG tablet    Pharmacy where request should be sent:  [facility](NOW [facility])    Additional details provided by patient:  PT HAS A WEEK WORTH OF THE MEDICATION  gabapentin (NEURONTIN) 600 MG tablet     Does the patient have less than a 3 day supply:  [] Yes  [x] No    Anita Bynum Rep   07/18/22 13:46 EDT

## 2022-08-01 RX ORDER — CYCLOBENZAPRINE HCL 10 MG
10 TABLET ORAL 2 TIMES DAILY PRN
Qty: 180 TABLET | Refills: 3 | Status: SHIPPED | OUTPATIENT
Start: 2022-08-01 | End: 2022-11-28 | Stop reason: SDUPTHER

## 2022-08-01 NOTE — TELEPHONE ENCOUNTER
Caller: DEWAYNE     Relationship: PT     Requested Prescriptions:   Requested Prescriptions     Pending Prescriptions Disp Refills   • cyclobenzaprine (FLEXERIL) 10 MG tablet 180 tablet 3     Sig: Take 1 tablet by mouth 2 (Two) Times a Day As Needed for Muscle Spasms.        Pharmacy where request should be sent: [facility] (NOW [facility]) - [facility] [address] - [phone]  - [phone] FX     Additional details provided by patient: PT IS ALMOST OUT     Does the patient have less than a 3 day supply:  [] Yes  [x] No    Anita Coyle Rep   08/01/22 09:52 EDT

## 2022-08-01 NOTE — TELEPHONE ENCOUNTER
Rx Refill Note  Requested Prescriptions     Pending Prescriptions Disp Refills   • cyclobenzaprine (FLEXERIL) 10 MG tablet 180 tablet 3     Sig: Take 1 tablet by mouth 2 (Two) Times a Day As Needed for Muscle Spasms.      Last filled: 7/8/2021 90 day with 3 refills  Sent this in.   Last office visit with prescribing clinician: 4/15/2022      Next office visit with prescribing clinician: 10/17/2022     Holly Gonzalez MA  08/01/22, 10:05 EDT

## 2022-09-16 DIAGNOSIS — R25.2 SPASTIC: Chronic | ICD-10-CM

## 2022-09-16 DIAGNOSIS — G35 MULTIPLE SCLEROSIS: ICD-10-CM

## 2022-09-16 RX ORDER — BACLOFEN 10 MG/1
10 TABLET ORAL 3 TIMES DAILY
Qty: 270 TABLET | Refills: 3 | Status: SHIPPED | OUTPATIENT
Start: 2022-09-16 | End: 2023-01-23 | Stop reason: SDUPTHER

## 2022-09-16 RX ORDER — TIZANIDINE 4 MG/1
8 TABLET ORAL
Qty: 180 TABLET | Refills: 3 | Status: SHIPPED | OUTPATIENT
Start: 2022-09-16 | End: 2022-10-25

## 2022-09-16 RX ORDER — FAMOTIDINE 20 MG/1
20 TABLET, FILM COATED ORAL 2 TIMES DAILY
Qty: 180 TABLET | Refills: 3 | Status: SHIPPED | OUTPATIENT
Start: 2022-09-16

## 2022-09-16 NOTE — TELEPHONE ENCOUNTER
Rx Refill Note  Requested Prescriptions     Pending Prescriptions Disp Refills   • baclofen (LIORESAL) 10 MG tablet 270 tablet 3     Sig: Take 1 tablet by mouth 3 (Three) Times a Day.   • famotidine (PEPCID) 20 MG tablet 180 tablet 3     Sig: Take 1 tablet by mouth 2 (Two) Times a Day.   • tiZANidine (ZANAFLEX) 4 MG tablet 180 tablet 3     Sig: Take 2 tablets by mouth every night at bedtime.      Last filled: Switched to [facility] mail order pharmacy. Sent these in.   Last office visit with prescribing clinician: 4/15/2022      Next office visit with prescribing clinician: 10/17/2022     Holly Gonzalez MA  09/16/22, 16:37 EDT

## 2022-09-16 NOTE — TELEPHONE ENCOUNTER
Caller: DEWAYNE NARAYAN    Relationship: SELF    Best call back number: [phone]    Requested Prescriptions: baclofen (LIORESAL) 10 MG tablet,famotidine (PEPCID) 20 MG tablet ,tiZANidine (ZANAFLEX) 4 MG tablet  Requested Prescriptions      No prescriptions requested or ordered in this encounter        Pharmacy where request should be sent: [facility] (NOW [facility]) - [address] - [phone] [facility] [phone] FX     Additional details provided by patient: PT HAS ENOUGH UNTIL 9-21-22    Does the patient have less than a 3 day supply:  [] Yes  [x] No    Anita Bynum Rep   09/16/22 15:01 EDT         ”

## 2022-09-23 ENCOUNTER — SPECIALTY PHARMACY (OUTPATIENT)
Dept: ONCOLOGY | Facility: HOSPITAL | Age: 42
End: 2022-09-23

## 2022-09-23 RX ORDER — DALFAMPRIDINE 10 MG/1
10 TABLET, FILM COATED, EXTENDED RELEASE ORAL 2 TIMES DAILY
Qty: 60 TABLET | Refills: 11 | Status: SHIPPED | OUTPATIENT
Start: 2022-09-23 | End: 2022-09-27 | Stop reason: SDUPTHER

## 2022-09-27 RX ORDER — DALFAMPRIDINE 10 MG/1
10 TABLET, FILM COATED, EXTENDED RELEASE ORAL 2 TIMES DAILY
Qty: 60 TABLET | Refills: 11 | Status: SHIPPED | OUTPATIENT
Start: 2022-09-27

## 2022-10-03 ENCOUNTER — TELEPHONE (OUTPATIENT)
Dept: NEUROLOGY | Facility: CLINIC | Age: 42
End: 2022-10-03

## 2022-10-03 NOTE — TELEPHONE ENCOUNTER
Caller: ELMER WITH [facility]    Relationship:     Best call back number: [phone]    What medications are you currently taking:   Current Outpatient Medications on File Prior to Visit   Medication Sig Dispense Refill   • baclofen (LIORESAL) 10 MG tablet Take 1 tablet by mouth 3 (Three) Times a Day. 270 tablet 3   • cyclobenzaprine (FLEXERIL) 10 MG tablet Take 1 tablet by mouth 2 (Two) Times a Day As Needed for Muscle Spasms. 180 tablet 3   • Dalfampridine ER 10 MG tablet sustained-release 12 hour Take 1 tablet by mouth 2 (Two) Times a Day. 60 tablet 11   • Diroximel Fumarate (Vumerity) 231 MG capsule delayed-release Take 2 capsules by mouth 2 (Two) Times a Day. 120 capsule 11   • famotidine (PEPCID) 20 MG tablet Take 1 tablet by mouth 2 (Two) Times a Day. 180 tablet 3   • gabapentin (NEURONTIN) 600 MG tablet Take 1 tablet by mouth 3 (Three) Times a Day. 270 tablet 1   • tiZANidine (ZANAFLEX) 4 MG tablet Take 2 tablets by mouth every night at bedtime. 180 tablet 3     No current facility-administered medications on file prior to visit.          When did you start taking these medications: N/A    Which medication are you concerned about: DALFAMPRIDINE    Who prescribed you this medication: DR. QUINTANILLA    What are your concerns: IS THE PATIENT ALSO TAKING A DMD MEDICATION ALONG WITH DALFAMPRIDINE    PLEASE ADVISE    THANK YOU    How long have you had these concerns: N/A

## 2022-10-25 DIAGNOSIS — R25.2 SPASTIC: Chronic | ICD-10-CM

## 2022-10-25 RX ORDER — TIZANIDINE 4 MG/1
8 TABLET ORAL
Qty: 180 TABLET | Refills: 0 | Status: SHIPPED | OUTPATIENT
Start: 2022-10-25 | End: 2023-01-12 | Stop reason: SDUPTHER

## 2022-10-25 NOTE — TELEPHONE ENCOUNTER
Rx Refill Note  Requested Prescriptions     Pending Prescriptions Disp Refills   • tiZANidine (ZANAFLEX) 4 MG tablet [Pharmacy Med Name: TIZANIDINE 4MG TABLETS] 180 tablet 3     Sig: TAKE 2 TABLETS BY MOUTH EVERY NIGHT AT BEDTIME      Last filled: 9/16/22 90 day with 3 refills asking for this to be sent to [facility] sent this in  Last office visit with prescribing clinician: 4/15/2022      Next office visit with prescribing clinician: 12/20/2022     Holly Gonzalez MA  10/25/22, 09:49 EDT

## 2022-10-26 ENCOUNTER — TELEPHONE (OUTPATIENT)
Dept: NEUROLOGY | Facility: CLINIC | Age: 42
End: 2022-10-26

## 2022-10-26 NOTE — TELEPHONE ENCOUNTER
Rx Refill Note  Requested Prescriptions      No prescriptions requested or ordered in this encounter      Last filled: Flexeril 10mg was sent to [facility] on 8/1/22 90 day with 3 refills (too soon to fill.) Her tizanidine was sent in yesterday as well.   Last office visit with prescribing clinician: 4/15/2022      Next office visit with prescribing clinician: 12/20/2022     Holly Gonzalez MA  10/26/22, 11:20 EDT

## 2022-10-26 NOTE — TELEPHONE ENCOUNTER
3}    Medication requested (name and dose):   \  FLEXERIL 10 MG TABLET    Pharmacy where request should be sent:     [facility]  PH: [phone]  FX: [phone]    Additional details provided by patient:     Best call back number: [phone]      Does the patient have less than a 3 day supply:  [] Yes  [x] No    Anita Castano Rep  10/26/22, 10:31 EDT

## 2022-11-15 RX ORDER — CYCLOBENZAPRINE HCL 10 MG
10 TABLET ORAL 2 TIMES DAILY PRN
Qty: 180 TABLET | Refills: 3 | Status: CANCELLED | OUTPATIENT
Start: 2022-11-15

## 2022-11-15 NOTE — TELEPHONE ENCOUNTER
Rx Refill Note  Requested Prescriptions     Pending Prescriptions Disp Refills   • cyclobenzaprine (FLEXERIL) 10 MG tablet 180 tablet 3     Sig: Take 1 tablet by mouth 2 (Two) Times a Day As Needed for Muscle Spasms.      Last filled: 8/1/22 90 day with 3 refills.     We sent a years supply in August. Patient still has 2 additional refills on file with [facility] mail delivery so may just need to contact them to let them know she is ready for her refill. Thanks!  Last office visit with prescribing clinician: 4/15/2022      Next office visit with prescribing clinician: 12/20/2022     Holly Gonzalez MA  11/15/22, 15:26 EST

## 2022-11-15 NOTE — TELEPHONE ENCOUNTER
Caller: DEWAYNE    Relationship: PATIENT    Best call back number: [phone]    Requested Prescriptions:   Requested Prescriptions     Pending Prescriptions Disp Refills   • cyclobenzaprine (FLEXERIL) 10 MG tablet 180 tablet 3     Sig: Take 1 tablet by mouth 2 (Two) Times a Day As Needed for Muscle Spasms.        Pharmacy where request should be sent: [facility]     Additional details provided by patient: PATIENT IS OUT OF MEDICATION    Does the patient have less than a 3 day supply:  [x] Yes  [] No    Anita Shore Rep   11/15/22 15:03 EST

## 2022-11-28 RX ORDER — CYCLOBENZAPRINE HCL 10 MG
10 TABLET ORAL 2 TIMES DAILY PRN
Qty: 180 TABLET | Refills: 3 | Status: SHIPPED | OUTPATIENT
Start: 2022-11-28

## 2022-11-28 NOTE — TELEPHONE ENCOUNTER
Caller: Daya Ashford    Relationship: Self    Best call back number: [phone]    Requested Prescriptions:   Requested Prescriptions     Pending Prescriptions Disp Refills   • cyclobenzaprine (FLEXERIL) 10 MG tablet 180 tablet 3     Sig: Take 1 tablet by mouth 2 (Two) Times a Day As Needed for Muscle Spasms.        Pharm[facility] where request should be sent:  [facility]    Additional details provided by patient: PATIENT HAS BEEN OUT FOR ABOUT A MONTH    Does the patient have less than a 3 day supply:  [x] Yes  [] No    Anita Garrett Rep   11/28/22 11:34 EST

## 2022-11-28 NOTE — TELEPHONE ENCOUNTER
Rx Refill Note  Requested Prescriptions     Pending Prescriptions Disp Refills   • cyclobenzaprine (FLEXERIL) 10 MG tablet 180 tablet 3     Sig: Take 1 tablet by mouth 2 (Two) Times a Day As Needed for Muscle Spasms.      Last filled: 8/1/22 with 3 refills sent this in.   Last office visit with prescribing clinician: 4/15/2022      Next office visit with prescribing clinician: 12/20/2022     Holly Gonzalez MA  11/28/22, 11:54 EST

## 2022-12-05 DIAGNOSIS — M79.2 NEUROPATHIC PAIN: Chronic | ICD-10-CM

## 2022-12-06 RX ORDER — GABAPENTIN 600 MG/1
TABLET ORAL
Qty: 270 TABLET | Refills: 1 | Status: SHIPPED | OUTPATIENT
Start: 2022-12-06

## 2022-12-06 NOTE — TELEPHONE ENCOUNTER
Rx Refill Note  Requested Prescriptions     Pending Prescriptions Disp Refills   • gabapentin (NEURONTIN) 600 MG tablet [Pharmacy Med Name: GABAPENTIN 600 MG Tablet] 270 tablet 1     Sig: TAKE 1 TABLET THREE TIMES DAILY      Last filled: 7/18/22 90 day with 1 refill pend to provider  Last office visit with prescribing clinician: 4/15/2022      Next office visit with prescribing clinician: 12/20/2022     Holly Gonzalez MA  12/06/22, 12:13 EST

## 2023-01-11 ENCOUNTER — PRIOR AUTHORIZATION (OUTPATIENT)
Dept: NEUROLOGY | Facility: CLINIC | Age: 43
End: 2023-01-11
Payer: MEDICARE

## 2023-01-11 ENCOUNTER — TELEPHONE (OUTPATIENT)
Dept: NEUROLOGY | Facility: CLINIC | Age: 43
End: 2023-01-11
Payer: MEDICARE

## 2023-01-11 DIAGNOSIS — R25.2 SPASTIC: Chronic | ICD-10-CM

## 2023-01-11 NOTE — TELEPHONE ENCOUNTER
Cyclobenzaprine PA was previously approved and is still good until 12/31/23    Working on tizinadine PA now

## 2023-01-11 NOTE — TELEPHONE ENCOUNTER
Cyclobenzaprine 10mg has been approved and will be good until 12/2023 however still waiting on insurance response for Tizanidine. They were needing clarification of why she was taking both meds from the same pharm class.    Attempted to call Daya to update her but it keeps saying the # is not in service.

## 2023-01-11 NOTE — TELEPHONE ENCOUNTER
Provider: DWIGHT  Caller: DEWAYNE  Relationship to Patient: SELF  Pharmacy: [facility]  Phone Number:[phone]  Reason for Call: PT CALLED AND WANTED TO KNOW IF PROVIDER CAN SEND PRIOR AUTH FOR FLEXERIL AND TRIZANIDINE TO [facility]. PT STATES SHE HAS BEEN OUT OF MEDICATION FOR ALMOST 1 MONTH.

## 2023-01-12 RX ORDER — TIZANIDINE 4 MG/1
8 TABLET ORAL
Qty: 180 TABLET | Refills: 2 | Status: SHIPPED | OUTPATIENT
Start: 2023-01-12

## 2023-01-12 NOTE — TELEPHONE ENCOUNTER
Tizanidine  Approvedtoday  PA Case: [id number], Status: Approved, Coverage Starts on: 1/1/2023 12:00:00 AM, Coverage Ends on: 12/31/2023 12:00:00 AM. Questions? Contact [phone].    Notified patient

## 2023-01-12 NOTE — TELEPHONE ENCOUNTER
Checked CMM. Her cyclobenzaprine is approved however Tizanidine stil waiting on response from insurance.     Cannot get Darian to update her.

## 2023-01-20 ENCOUNTER — TELEPHONE (OUTPATIENT)
Dept: NEUROLOGY | Facility: CLINIC | Age: 43
End: 2023-01-20
Payer: MEDICARE

## 2023-01-20 DIAGNOSIS — R25.2 SPASTIC: Chronic | ICD-10-CM

## 2023-01-20 NOTE — TELEPHONE ENCOUNTER
Caller: Daya Ashford    Relationship: Self    Best call back number: [phone]    What was the call regarding: PATIENT RECEIVED A LETTER FROM HER INSURANCE COMPANYING STATING THAT SHE WILL NEED TO OBTAIN PRIOR AUTH FOR THE FOLLOWINGS MEDS BEFORE THEY CAN BE FILLED AGAIN: DALFAMPRIDINE ER 10 MG AND BACLOFEN. SHE'S NOT SURE WHAT TO DO FOR THIS. PATIENT IS COMPLETELY OUT OF THE BACLOFEN. SHE'S GOOD ON THE OTHER MEDICINE UNTIL 2/4/23. PLEASE REVIEW AND ADVISE.    Do you require a callback: YES

## 2023-01-23 RX ORDER — BACLOFEN 10 MG/1
10 TABLET ORAL 3 TIMES DAILY
Qty: 270 TABLET | Refills: 3 | Status: SHIPPED | OUTPATIENT
Start: 2023-01-23

## 2023-01-23 NOTE — TELEPHONE ENCOUNTER
Caller: Daya Ashford    Relationship: Self    Best call back number: [phone]    What was the call regarding: PT CALLED TO CHECK STATUS OF PRIOR AUTHORIZATIONS. I ADVISED PT THAT NEREYDA IS WORKING ON BACLOFEN PA, TREMAYNE WORKING ON THE AMPYRA PA, AND THAT TIZANIDINE PA WAS APPROVED ON 1/12/23.    PT WOULD LIKE TO BE NOTIFIED OF ANY UPDATES AS SHE HAS BEEN OUT OF THE BACLOFEN FOR APPROXIMATELY 1 MONTH.    Do you require a callback: YES, PLEASE.    PLEASE REVIEW AND ADVISE.

## 2023-01-23 NOTE — TELEPHONE ENCOUNTER
I looked into this and Insurance will not approve both her tizanidine and baclofen as they are both in similar classes/same class of meds.    She can use GoodRx to get her Baclofen: 90 epi at [facility] is $3.82, [facility] is $11.76 or whichever local pharmacy is closest to her she can get on [employer] and check it out and we can send it to the local pharmacy of her choice but they will not approve both of these anymore.

## 2023-01-24 NOTE — TELEPHONE ENCOUNTER
Spoke with [facility] pharmacy to let them know that patient has been getting both Flexeril & Tizanidine since 2016. Pharmacist verbalized understanding.

## 2023-01-24 NOTE — TELEPHONE ENCOUNTER
PEDRO PABLO WITH [facility] CALLED. SHE IS NEEDING CLARIFICATION OF CYCLOBENZAPRINE.     CALLBACK NUMBER [phone]    THANK YOU

## 2023-01-29 ENCOUNTER — DOCUMENTATION (OUTPATIENT)
Dept: ONCOLOGY | Facility: HOSPITAL | Age: 43
End: 2023-01-29
Payer: MEDICARE

## 2023-02-01 ENCOUNTER — TELEPHONE (OUTPATIENT)
Dept: NEUROLOGY | Facility: CLINIC | Age: 43
End: 2023-02-01

## 2023-02-01 NOTE — TELEPHONE ENCOUNTER
DELETE AFTER REVIEWING: Telephone encounter to be sent to the clinical pool     Caller: YAZAN WITH COVER MY [employer]    Relationship: N/A    Best call back number: [phone]    What medications are you currently taking:   Current Outpatient Medications on File Prior to Visit   Medication Sig Dispense Refill   • baclofen (LIORESAL) 10 MG tablet Take 1 tablet by mouth 3 (Three) Times a Day. 270 tablet 3   • cyclobenzaprine (FLEXERIL) 10 MG tablet Take 1 tablet by mouth 2 (Two) Times a Day As Needed for Muscle Spasms. 180 tablet 3   • Dalfampridine ER 10 MG tablet sustained-release 12 hour Take 1 tablet by mouth 2 (Two) Times a Day. 60 tablet 11   • Diroximel Fumarate (Vumerity) 231 MG capsule delayed-release Take 2 capsules by mouth 2 (Two) Times a Day. 120 capsule 11   • famotidine (PEPCID) 20 MG tablet Take 1 tablet by mouth 2 (Two) Times a Day. 180 tablet 3   • gabapentin (NEURONTIN) 600 MG tablet TAKE 1 TABLET THREE TIMES DAILY 270 tablet 1   • tiZANidine (ZANAFLEX) 4 MG tablet Take 2 tablets by mouth every night at bedtime. 180 tablet 2     No current facility-administered medications on file prior to visit.          When did you start taking these medications: N/A    Which medication are you concerned about: VUMERITY    Who prescribed you this medication: DR. QUINTANILLA    What are your concerns: PA HAS ; KEY IS [id number]    How long have you had these concerns: N/A

## 2023-02-07 ENCOUNTER — TELEPHONE (OUTPATIENT)
Dept: NEUROLOGY | Facility: CLINIC | Age: 43
End: 2023-02-07

## 2023-02-07 NOTE — TELEPHONE ENCOUNTER
PATIENT CALLING TO REPORT, SHE WAS OFF MEDICATIONS FOR ABOUT 1 MONTH BUT IS NOW BACK ON.    HOWEVER,   ISSUE W/BOTH LEGS - JERKING ON AND OFF, WHOLE BACK ISSUES AS WELL AND AT TIMES PAIN SHOOTING UP BOTH ARMS.    IS THIS A MEDICATION ISSUE?  DOES SOMETHING NEED TO BE ADJUSTED?    PATIENT HAS AN UPCOMING APPOINTMENT BUT THIS ISSUES HAS INCREASED AND SHE FEELS SHE CAN'T WAIT.    PLEASE ADVISE PATIENT    THANK YOU

## 2023-02-08 NOTE — PROGRESS NOTES
Neuro Office Visit      Encounter Date: 2023   Patient Name: Daya Ashford  : 1980   MRN: [id number]   PCP:  Kristen Frausto MD     Chief Complaint:    Chief Complaint   Patient presents with   • Multiple Sclerosis   • Neuropathic pain       History of Present Illness: Daya Ashford is a 42 y.o. female who is here today in Neurology for RRMS.     Last visit with Dr. Panda on 4/15/2022, continued on Vumerity, baclofen, Zanaflex, referred to physical therapy.    She has been doing well on her medications until the first of the year when [facility] would not pay for baclofen or cyclobenzaprine.  She was off those medications for over a month and noted that she had jerking movements that were increasing.    She also developed sharp pain in her neck with any neck flexion.  Bilateral hip pain which was worse rolling over.  She describes the pain as sharp and in her hips as well as sciatic pain.  She did go to physical therapy but did not feel it was beneficial for her.  She is also noted spasms in her side and back.    She is more tolerant of the cold and notes more spasms when it is cold.    [facility] finally approved her cyclobenzaprine and baclofen and she has started back on them within the last month.    She does feel that she is slowly improving from that.    She does have an occasional MS hug.    Subjective        Past Medical History:   Past Medical History:   Diagnosis Date   • MS (multiple sclerosis) (HCC)        Past Surgical History: History reviewed. No pertinent surgical history.    Family History:   Family History   Problem Relation Age of Onset   • Mental illness Mother    • Mental illness Other    • Alcohol abuse Other    • Cancer Other    • Diabetes Other        Social History:   Social History     Socioeconomic History   • Marital status: Single   Tobacco Use   • Smoking status: Every Day     Packs/day: 0.50     Years: 15.00     Pack years: 7.50     Types: Cigarettes   • Smokeless  tobacco: Never   Vaping Use   • Vaping Use: Never used   Substance and Sexual Activity   • Alcohol use: No   • Drug use: Never   • Sexual activity: Yes     Partners: Male     Birth control/protection: None       Medications:     Current Outpatient Medications:   •  baclofen (LIORESAL) 10 MG tablet, Take 1 tablet by mouth 3 (Three) Times a Day., Disp: 270 tablet, Rfl: 3  •  cyclobenzaprine (FLEXERIL) 10 MG tablet, Take 1 tablet by mouth 2 (Two) Times a Day As Needed for Muscle Spasms., Disp: 180 tablet, Rfl: 3  •  Dalfampridine ER 10 MG tablet sustained-release 12 hour, Take 1 tablet by mouth 2 (Two) Times a Day., Disp: 60 tablet, Rfl: 11  •  Diroximel Fumarate (Vumerity) 231 MG capsule delayed-release, Take 2 capsules by mouth 2 (Two) Times a Day., Disp: 120 capsule, Rfl: 11  •  famotidine (PEPCID) 20 MG tablet, Take 1 tablet by mouth 2 (Two) Times a Day., Disp: 180 tablet, Rfl: 3  •  gabapentin (NEURONTIN) 600 MG tablet, TAKE 1 TABLET THREE TIMES DAILY, Disp: 270 tablet, Rfl: 1  •  tiZANidine (ZANAFLEX) 4 MG tablet, Take 2 tablets by mouth every night at bedtime., Disp: 180 tablet, Rfl: 2  •  methylPREDNISolone (MEDROL) 4 MG dose pack, Take as directed on package instructions., Disp: 1 each, Rfl: 0    Allergies:   No Known Allergies    PHQ-9 Total Score:     STEADI Fall Risk Assessment has not been completed.    Objective     Physical Exam:   Physical Exam  Vitals reviewed.   Eyes:      Extraocular Movements: EOM normal.      Pupils: Pupils are equal, round, and reactive to light.   Neurological:      Mental Status: She is oriented to person, place, and time.   Psychiatric:         Speech: Speech normal.         Neurologic Exam     Mental Status   Oriented to person, place, and time.   Attention: normal. Concentration: normal.   Speech: speech is normal   Level of consciousness: alert  Knowledge: good.     Cranial Nerves     CN II   Visual fields full to confrontation.     CN III, IV, VI   Pupils are equal, round,  "and reactive to light.  Extraocular motions are normal.   CN III: no CN III palsy  CN VI: no CN VI palsy    CN V   Facial sensation intact.     CN VII   Facial expression full, symmetric.     CN VIII   CN VIII normal.     CN IX, X   CN IX normal.   CN X normal.     CN XI   CN XI normal.     CN XII   CN XII normal.     Motor Exam     Strength   Right neck flexion: 4/5  Left neck flexion: 4/5  Right neck extension: 4/5  Left neck extension: 4/5  Right deltoid: 4/5  Left deltoid: 4/5  Right biceps: 4/5  Left biceps: 4/5  Right triceps: 4/5  Left triceps: 4/5  Right wrist flexion: 4/5  Left wrist flexion: 4/5  Right wrist extension: 4/5  Left wrist extension: 4/5  Right interossei: 4/5  Left interossei: 4/5  Right abdominals: 4/5  Left abdominals: 4/5  Right iliopsoas: 4/5  Left iliopsoas: 4/5  Right quadriceps: 4/5  Left quadriceps: 4/5  Right hamstrin/5  Left hamstrin/5  Right glutei: 4/5  Left glutei: 4/5  Right anterior tibial: 4/5  Left anterior tibial: 4/5  Right posterior tibial: 4/5  Left posterior tibial: 4/5  Right peroneal: 4/5  Left peroneal: 4/5  Right gastroc: 4/5  Left gastroc: 4/5    Sensory Exam   Light touch normal.     Gait, Coordination, and Reflexes     Gait  Gait: spastic       Vital Signs:   Vitals:    23 1330   BP: 132/74   Pulse: 113   Temp: 97.5 °F (36.4 °C)   SpO2: 100%   Weight: 72 kg (158 lb 12.8 oz)   Height: 167.6 cm (65.98\")     Body mass index is 25.64 kg/m².     Results:   Imaging:   No Images in the past 120 days found..     Labs:    No results found for: CMP, PROTEIN, ANTIMOGAB, UEEOIY1KQAC, JCVRESULT, QUANTTBGOLD, CBCDIF, IGGALBSER     Assessment / Plan      Assessment/Plan:   Diagnoses and all orders for this visit:    1. Multiple sclerosis (HCC) (Primary)  -     MRI Brain With & Without Contrast; Future  -     MRI Cervical Spine With & Without Contrast; Future  -     CBC & Differential; Future  -     Comprehensive Metabolic Panel; Future    Other orders  -     " methylPREDNISolone (MEDROL) 4 MG dose pack; Take as directed on package instructions.  Dispense: 1 each; Refill: 0           Patient Education:   Since she is still having issues with spasticity we will try a Medrol Dosepak to see if that will help since she is on her medications once more.  She has not had imaging since August, 2021 so those have been ordered.    Reviewed medications, potential side effects and signs and symptoms to report. Discussed risk versus benefits of treatment plan with patient and/or family-including medications, labs and radiology that may be ordered. Addressed questions and concerns during visit. Patient and/or family verbalized understanding and agree with plan. Instructed to call the office with any questions and report to ER with any life-threatening symptoms.     Follow Up:   Return for Next scheduled follow up.    I spent 30  minutes face to face with the patient. I personally spent 50 percent of this time counseling and discussing diagnosis, diagnostic testing, treatment options and management .       During this visit the following were done:  Labs Reviewed []    Labs Ordered [x]    Radiology Reports Reviewed []    Radiology Ordered [x]    PCP Records Reviewed []    Referring Provider Records Reviewed []    ER Records Reviewed []    Hospital Records Reviewed []    History Obtained From Family []    Radiology Images Reviewed []    Other Reviewed [x]    Records Requested []      AARON Haro  [facility]  [address]  [place] [address] [phone]

## 2023-02-09 ENCOUNTER — OFFICE VISIT (OUTPATIENT)
Dept: NEUROLOGY | Facility: CLINIC | Age: 43
End: 2023-02-09
Payer: MEDICARE

## 2023-02-09 ENCOUNTER — LAB (OUTPATIENT)
Dept: LAB | Facility: HOSPITAL | Age: 43
End: 2023-02-09
Payer: MEDICARE

## 2023-02-09 VITALS
HEART RATE: 113 BPM | BODY MASS INDEX: 25.52 KG/M2 | SYSTOLIC BLOOD PRESSURE: 132 MMHG | OXYGEN SATURATION: 100 % | TEMPERATURE: 97.5 F | WEIGHT: 158.8 LBS | DIASTOLIC BLOOD PRESSURE: 74 MMHG | HEIGHT: 66 IN

## 2023-02-09 DIAGNOSIS — G35 MULTIPLE SCLEROSIS: ICD-10-CM

## 2023-02-09 DIAGNOSIS — G35 MULTIPLE SCLEROSIS: Primary | ICD-10-CM

## 2023-02-09 PROCEDURE — 99214 OFFICE O/P EST MOD 30 MIN: CPT | Performed by: NURSE PRACTITIONER

## 2023-02-09 RX ORDER — METHYLPREDNISOLONE 4 MG/1
TABLET ORAL
Qty: 1 EACH | Refills: 0 | Status: SHIPPED | OUTPATIENT
Start: 2023-02-09 | End: 2023-03-20

## 2023-02-10 LAB
ALBUMIN SERPL-MCNC: 4.6 G/DL (ref 3.5–5.2)
ALBUMIN/GLOB SERPL: 1.5 G/DL
ALP SERPL-CCNC: 53 U/L (ref 39–117)
ALT SERPL-CCNC: 10 U/L (ref 1–33)
AST SERPL-CCNC: 10 U/L (ref 1–32)
BASOPHILS # BLD AUTO: 0.08 10*3/MM3 (ref 0–0.2)
BASOPHILS NFR BLD AUTO: 1.1 % (ref 0–1.5)
BILIRUB SERPL-MCNC: 0.3 MG/DL (ref 0–1.2)
BUN SERPL-MCNC: 16 MG/DL (ref 6–20)
BUN/CREAT SERPL: 22.5 (ref 7–25)
CALCIUM SERPL-MCNC: 9.5 MG/DL (ref 8.6–10.5)
CHLORIDE SERPL-SCNC: 105 MMOL/L (ref 98–107)
CO2 SERPL-SCNC: 25.5 MMOL/L (ref 22–29)
CREAT SERPL-MCNC: 0.71 MG/DL (ref 0.57–1)
EGFRCR SERPLBLD CKD-EPI 2021: 107.7 ML/MIN/1.73
EOSINOPHIL # BLD AUTO: 0.15 10*3/MM3 (ref 0–0.4)
EOSINOPHIL NFR BLD AUTO: 2 % (ref 0.3–6.2)
ERYTHROCYTE [DISTWIDTH] IN BLOOD BY AUTOMATED COUNT: 12.7 % (ref 12.3–15.4)
GLOBULIN SER CALC-MCNC: 3 GM/DL
GLUCOSE SERPL-MCNC: 91 MG/DL (ref 65–99)
HCT VFR BLD AUTO: 44.5 % (ref 34–46.6)
HGB BLD-MCNC: 15 G/DL (ref 12–15.9)
IMM GRANULOCYTES # BLD AUTO: 0.04 10*3/MM3 (ref 0–0.05)
IMM GRANULOCYTES NFR BLD AUTO: 0.5 % (ref 0–0.5)
LYMPHOCYTES # BLD AUTO: 1.5 10*3/MM3 (ref 0.7–3.1)
LYMPHOCYTES NFR BLD AUTO: 20.2 % (ref 19.6–45.3)
MCH RBC QN AUTO: 28.6 PG (ref 26.6–33)
MCHC RBC AUTO-ENTMCNC: 33.7 G/DL (ref 31.5–35.7)
MCV RBC AUTO: 84.8 FL (ref 79–97)
MONOCYTES # BLD AUTO: 0.51 10*3/MM3 (ref 0.1–0.9)
MONOCYTES NFR BLD AUTO: 6.9 % (ref 5–12)
NEUTROPHILS # BLD AUTO: 5.14 10*3/MM3 (ref 1.7–7)
NEUTROPHILS NFR BLD AUTO: 69.3 % (ref 42.7–76)
NRBC BLD AUTO-RTO: 0 /100 WBC (ref 0–0.2)
PLATELET # BLD AUTO: 227 10*3/MM3 (ref 140–450)
POTASSIUM SERPL-SCNC: 5 MMOL/L (ref 3.5–5.2)
PROT SERPL-MCNC: 7.6 G/DL (ref 6–8.5)
RBC # BLD AUTO: 5.25 10*6/MM3 (ref 3.77–5.28)
SODIUM SERPL-SCNC: 139 MMOL/L (ref 136–145)
WBC # BLD AUTO: 7.42 10*3/MM3 (ref 3.4–10.8)

## 2023-02-13 ENCOUNTER — TELEPHONE (OUTPATIENT)
Dept: NEUROLOGY | Facility: CLINIC | Age: 43
End: 2023-02-13
Payer: MEDICARE

## 2023-02-13 NOTE — TELEPHONE ENCOUNTER
Called patient and gave results.  She was understanding and appreciative.    ----- Message from AARON Haro sent at 2/10/2023  8:15 AM EST -----  Please let Daya know that her labs are normal.

## 2023-02-21 ENCOUNTER — TELEPHONE (OUTPATIENT)
Dept: NEUROLOGY | Facility: CLINIC | Age: 43
End: 2023-02-21
Payer: MEDICARE

## 2023-03-20 ENCOUNTER — OFFICE VISIT (OUTPATIENT)
Dept: NEUROLOGY | Facility: CLINIC | Age: 43
End: 2023-03-20
Payer: MEDICARE

## 2023-03-20 VITALS
TEMPERATURE: 96.8 F | WEIGHT: 162.4 LBS | OXYGEN SATURATION: 98 % | SYSTOLIC BLOOD PRESSURE: 126 MMHG | HEIGHT: 66 IN | HEART RATE: 105 BPM | BODY MASS INDEX: 26.1 KG/M2 | DIASTOLIC BLOOD PRESSURE: 76 MMHG

## 2023-03-20 DIAGNOSIS — G35 MULTIPLE SCLEROSIS: Primary | Chronic | ICD-10-CM

## 2023-03-20 DIAGNOSIS — R26.9 GAIT ABNORMALITY: Chronic | ICD-10-CM

## 2023-03-20 DIAGNOSIS — R25.2 SPASTIC: Chronic | ICD-10-CM

## 2023-03-20 DIAGNOSIS — M79.2 NEUROPATHIC PAIN: Chronic | ICD-10-CM

## 2023-03-20 PROCEDURE — 1160F RVW MEDS BY RX/DR IN RCRD: CPT | Performed by: PSYCHIATRY & NEUROLOGY

## 2023-03-20 PROCEDURE — 99214 OFFICE O/P EST MOD 30 MIN: CPT | Performed by: PSYCHIATRY & NEUROLOGY

## 2023-03-20 PROCEDURE — 1159F MED LIST DOCD IN RCRD: CPT | Performed by: PSYCHIATRY & NEUROLOGY

## 2023-03-20 NOTE — PROGRESS NOTES
Chief Complaint  Multiple Sclerosis    Subjective        Daya Ashford presents to [facility] NEUROLOGY [facility]     History of Present Illness     42 y.o. female returns in follow up.  Last visit on 2/9/23 continued Vumerity, Zanaflex, baclofen, Ampyra. GBP.      MRI Brain/cervical, my review of films, 2/9/23 as compared 6/4/21  , moderate T2 lesion load without enhancing lesions, C5/6 DDD, no cord lesions.      JCV 1/23/17 1.2         RRMS     T25FW  6.91 sec      Occasional hot flashes on Vumerity.       Vision is blurry as day progresses stable      One fall since last visit no injury.       MSFC scores are stable      Compliant with Vumerity and tolerating without difficulty.       Gait is spastic and unsteady.       ST memory, attention/concentraion difficulties .      Legs and hands will spasm with cooler weather     Fingers are going numb.        Neuropathic pain      Baclofen 10 mg TID, GBP and Zanaflex controlling pain in neck and back.       Family has CMT.      PMH:     Transferred to Dr Almaguer for RRMS.  Sx started in 2007 loss of vision in right eye for several days.  2008 left hemisensory loss for two weeks in October, then in December 2008 right sided numbness.  Dx 2009 and started on Rebif but had frequent episodes of numbness from waist down.  Swtiched to Tysabri for 6 months.  Then changed to Rebif for a year.  Back on Tysabri for 6 months.  Then back to Rebif for approx 6 years.      UTI and appendicitis in 2014 with appendectomy and since has had frequent UTI's and neurogenic bladders     Progressive decrease in gait and hand dexterity on Rebif.     Reviewed Dr Almaguer' notes:     Sx of muscle spasms, back pain, urinary retention and frequent UTI's.  Currently taking Rebif after taking Tysabri twice and stopped due to JCV positive ab.  Dx 10/2007 optic neuritis.  MRI progression from 2009 to 2012 with new pontine lesion     3/2016 - MRI C-spine, my review, patchy signal in c  "spine T4/5 enhancing lesion  4/13/16 - MRI T-spine, my review, WNL  Labs 3/4/16 - cbc, cmp wnl    Objective   Vital Signs:  /76   Pulse 105   Temp 96.8 °F (36 °C)   Ht 167.6 cm (65.98\")   Wt 73.7 kg (162 lb 6.4 oz)   SpO2 98%   BMI 26.22 kg/m²   Estimated body mass index is 26.22 kg/m² as calculated from the following:    Height as of this encounter: 167.6 cm (65.98\").    Weight as of this encounter: 73.7 kg (162 lb 6.4 oz).             Physical Exam  Eyes:      Extraocular Movements: EOM normal.      Pupils: Pupils are equal, round, and reactive to light.   Neurological:      Mental Status: She is oriented to person, place, and time.      Coordination: Finger-Nose-Finger Test normal.      Deep Tendon Reflexes:      Reflex Scores:       Patellar reflexes are 3+ on the right side and 3+ on the left side.       Achilles reflexes are 3+ on the right side and 3+ on the left side.  Psychiatric:         Speech: Speech normal.          Neurologic Exam     Mental Status   Oriented to person, place, and time.   Follows 3 step commands.   Attention: normal. Concentration: normal.   Speech: speech is normal   Level of consciousness: alert  Knowledge: good and consistent with education.   Able to name object. Able to read. Able to repeat. Able to write. Normal comprehension.     Cranial Nerves     CN II   Visual fields full to confrontation.   Visual acuity: normal  Right visual field deficit: none  Left visual field deficit: none     CN III, IV, VI   Pupils are equal, round, and reactive to light.  Extraocular motions are normal.   Right pupil: Shape: regular. Reactivity: brisk. Consensual response: intact.   Left pupil: Shape: regular. Reactivity: brisk. Consensual response: intact.   Nystagmus: none   Diplopia: none  Ophthalmoparesis: none  Upgaze: normal  Downgaze: normal  Conjugate gaze: present  Vestibulo-ocular reflex: present    CN V   Facial sensation intact.   Right corneal reflex: normal  Left corneal " reflex: normal    CN VII   Right facial weakness: none  Left facial weakness: none    CN VIII   Hearing: intact    CN IX, X   Palate: symmetric  Right gag reflex: normal  Left gag reflex: normal    CN XI   Right sternocleidomastoid strength: normal  Left sternocleidomastoid strength: normal    CN XII   Tongue: not atrophic  Fasciculations: absent  Tongue deviation: none    Motor Exam   Muscle bulk: normal  Overall muscle tone: increased  Right arm tone: normal  Left arm tone: normal  Right leg tone: spastic  Left leg tone: spastic    Strength   Strength 5/5 except as noted.   Left quadriceps: 4/5  Left hamstrin/5  Left glutei: 4/5  Left anterior tibial: 4/5  Left posterior tibial: 4/5  Left peroneal: 4/5  Left gastroc: 4/5    Sensory Exam   Light touch normal.     Gait, Coordination, and Reflexes     Gait  Gait: circumduction and spastic    Coordination   Finger to nose coordination: normal    Tremor   Resting tremor: absent  Intention tremor: absent  Action tremor: absent    Reflexes   Reflexes 2+ except as noted.   Right patellar: 3+  Left patellar: 3+  Right achilles: 3+  Left achilles: 3+     Result Review :                   Assessment and Plan   Diagnoses and all orders for this visit:    1. Multiple sclerosis (HCC) (Primary)  Assessment & Plan:  MSFC stable     Continue Vumerity       2. Neuropathic pain  Assessment & Plan:  Controlled on GBP, Baclofen, Zanaflex       3. Gait abnormality  Assessment & Plan:  Improved on Ampyra      4. Spastic           Follow Up   No follow-ups on file.  Patient was given instructions and counseling regarding her condition or for health maintenance advice. Please see specific information pulled into the AVS if appropriate.

## 2023-03-27 ENCOUNTER — SPECIALTY PHARMACY (OUTPATIENT)
Dept: ONCOLOGY | Facility: HOSPITAL | Age: 43
End: 2023-03-27
Payer: MEDICARE

## 2023-03-27 RX ORDER — DIROXIMEL FUMARATE 231 MG/1
462 CAPSULE ORAL 2 TIMES DAILY
Qty: 120 CAPSULE | Refills: 11 | Status: SHIPPED | OUTPATIENT
Start: 2023-03-27

## 2023-04-24 DIAGNOSIS — M79.2 NEUROPATHIC PAIN: Chronic | ICD-10-CM

## 2023-04-24 RX ORDER — GABAPENTIN 600 MG/1
TABLET ORAL
Qty: 270 TABLET | Refills: 1 | Status: SHIPPED | OUTPATIENT
Start: 2023-04-24

## 2023-04-24 NOTE — TELEPHONE ENCOUNTER
Rx Refill Note  Requested Prescriptions     Pending Prescriptions Disp Refills   • gabapentin (NEURONTIN) 600 MG tablet [Pharmacy Med Name: GABAPENTIN 600 MG Tablet] 270 tablet      Sig: TAKE 1 TABLET THREE TIMES DAILY      Last filled: 12/06/2022, 270 with 1 Refill.  Last office visit with prescribing clinician: 3/20/2023      Next office visit with prescribing clinician: 7/20/2023     Dorinda Roca MA  04/24/23, 16:01 EDT

## 2023-08-18 ENCOUNTER — SPECIALTY PHARMACY (OUTPATIENT)
Dept: ONCOLOGY | Facility: HOSPITAL | Age: 43
End: 2023-08-18
Payer: MEDICARE

## 2023-08-18 DIAGNOSIS — G35 MULTIPLE SCLEROSIS: Primary | ICD-10-CM

## 2023-09-11 DIAGNOSIS — M79.2 NEUROPATHIC PAIN: Chronic | ICD-10-CM

## 2023-09-11 RX ORDER — GABAPENTIN 600 MG/1
600 TABLET ORAL 3 TIMES DAILY
Qty: 270 TABLET | Refills: 1 | Status: SHIPPED | OUTPATIENT
Start: 2023-09-11 | End: 2024-09-10

## 2023-09-11 NOTE — TELEPHONE ENCOUNTER
Rx Refill Note  Requested Prescriptions     Pending Prescriptions Disp Refills    gabapentin (NEURONTIN) 600 MG tablet [Pharmacy Med Name: GABAPENTIN 600 MG Tablet] 270 tablet      Sig: TAKE 1 TABLET THREE TIMES DAILY      Last filled:  04/24/2023 w/1  Last office visit with prescribing clinician: 3/20/2023      Next office visit with prescribing clinician: 11/7/2023     Marcelina Anderson MA  09/11/23, 15:45 EDT

## 2023-09-15 DIAGNOSIS — R25.2 SPASTIC: Chronic | ICD-10-CM

## 2023-09-15 RX ORDER — TIZANIDINE 4 MG/1
TABLET ORAL
Qty: 180 TABLET | Refills: 2 | Status: SHIPPED | OUTPATIENT
Start: 2023-09-15

## 2023-09-15 RX ORDER — CYCLOBENZAPRINE HCL 10 MG
TABLET ORAL
Qty: 180 TABLET | Refills: 3 | Status: SHIPPED | OUTPATIENT
Start: 2023-09-15

## 2023-09-15 NOTE — TELEPHONE ENCOUNTER
Rx Refill Note  Requested Prescriptions     Pending Prescriptions Disp Refills    tiZANidine (ZANAFLEX) 4 MG tablet [Pharmacy Med Name: TIZANIDINE HYDROCHLORIDE 4 MG Tablet] 180 tablet 2     Sig: TAKE 2 TABLETS EVERY NIGHT AT BEDTIME    cyclobenzaprine (FLEXERIL) 10 MG tablet [Pharmacy Med Name: CYCLOBENZAPRINE HYDROCHLORIDE 10 MG Tablet] 180 tablet 3     Sig: TAKE 1 TABLET TWICE DAILY AS NEEDED FOR MUSCLE SPASM(S)      Last filled: 1/12/23, 180 with 2 Refills.   tiZANidine (ZANAFLEX)  Last filled: 11/28/22, 180 with 3 Refills.   cyclobenzaprine (FLEXERIL)   Last office visit with prescribing clinician: 3/20/2023      Next office visit with prescribing clinician: 11/7/2023     Dorinda Roca MA  09/15/23, 13:24 EDT

## 2023-09-22 ENCOUNTER — PRIOR AUTHORIZATION (OUTPATIENT)
Dept: NEUROLOGY | Facility: CLINIC | Age: 43
End: 2023-09-22
Payer: MEDICARE

## 2023-11-22 ENCOUNTER — DOCUMENTATION (OUTPATIENT)
Dept: ONCOLOGY | Facility: HOSPITAL | Age: 43
End: 2023-11-22
Payer: MEDICARE

## 2024-01-09 ENCOUNTER — SPECIALTY PHARMACY (OUTPATIENT)
Dept: ONCOLOGY | Facility: HOSPITAL | Age: 44
End: 2024-01-09
Payer: MEDICARE

## 2024-01-09 RX ORDER — DIROXIMEL FUMARATE 231 MG/1
462 CAPSULE ORAL 2 TIMES DAILY
Qty: 120 CAPSULE | Refills: 11 | Status: SHIPPED | OUTPATIENT
Start: 2024-01-09

## 2024-01-09 RX ORDER — DALFAMPRIDINE 10 MG/1
10 TABLET, FILM COATED, EXTENDED RELEASE ORAL 2 TIMES DAILY
Qty: 60 TABLET | Refills: 11 | Status: SHIPPED | OUTPATIENT
Start: 2024-01-09

## 2024-01-11 DIAGNOSIS — R25.2 SPASTIC: Chronic | ICD-10-CM

## 2024-01-11 RX ORDER — BACLOFEN 10 MG/1
10 TABLET ORAL 3 TIMES DAILY
Qty: 270 TABLET | Refills: 0 | Status: SHIPPED | OUTPATIENT
Start: 2024-01-11

## 2024-01-11 NOTE — TELEPHONE ENCOUNTER
Rx Refill Note  Requested Prescriptions     Pending Prescriptions Disp Refills    baclofen (LIORESAL) 10 MG tablet [Pharmacy Med Name: Baclofen 10 MG Oral Tablet] 270 tablet 0     Sig: TAKE 1 TABLET BY MOUTH THREE TIMES DAILY      Last filled:  01/23/23 w/3  Last office visit with prescribing clinician: 3/20/2023      Next office visit with prescribing clinician: 2/8/2024     Marcelina Anderson MA  01/11/24, 12:35 EST

## 2024-01-22 DIAGNOSIS — M79.2 NEUROPATHIC PAIN: Chronic | ICD-10-CM

## 2024-01-22 RX ORDER — GABAPENTIN 600 MG/1
600 TABLET ORAL 3 TIMES DAILY
Qty: 270 TABLET | Refills: 1 | Status: SHIPPED | OUTPATIENT
Start: 2024-01-22 | End: 2025-01-21

## 2024-01-24 NOTE — TELEPHONE ENCOUNTER
Caller: Daya Ashford    Relationship: Self    Best call back number [phone] (home)     Requested Prescriptions:   Requested Prescriptions     Pending Prescriptions Disp Refills    gabapentin (NEURONTIN) 600 MG tablet 270 tablet 1     Sig: Take 1 tablet by mouth 3 (Three) Times a Day.        Pharmacy where request should be sent: [address] [phone] [facility] [phone]      Last office visit with prescribing clinician: 3/20/2023   Last telemedicine visit with prescribing clinician: Visit date not found   Next office visit with prescribing clinician: 2/8/2024     Additional details provided by patient: PATIENT SWITCHING PHARMACY SO THE SCRIPT NEEDS SENT INTO THE PHARMACY LISTED ABOVE. PATIENT HAS 1 WEEKS WORTH OF THE RX LEFT    Does the patient have less than a 3 day supply:  [] Yes  [x] No    Would you like a call back once the refill request has been completed: [x] Yes [] No    If the office needs to give you a call back, can they leave a voicemail: [x] Yes [] No    Anita Eldridge Rep   01/22/24 10:27 EST     
Caller: Daya Ashford    Relationship: Self    Best call back number: [phone]    What was the call regarding: PT CALLED TO CHECK STATUS OF REFILL REQUEST. I ADVISED THE GABAPENTIN REFILL REQUEST WAS APPROVED AND SENT TO [facility] ON MONDAY, 1/22/24 @ 2:46PM. PT VERBALIZED UNDERSTANDING.    Do you require a callback: NO    DOCUMENTING PER PROTOCOL.  
PATIENT STATED [facility] HAS NOT RECEIVED IT; PLEASE RE-SEND REFILL  
Spoke to christine to inform that per the pharmacy they do have her gabapentin prescription but had questions about her insurance.  She expressed understanding and appreciation.  
Spoke to pharmacy.  They state they do have the prescription but did not have the correct insurance information.  
no

## 2024-02-05 ENCOUNTER — SPECIALTY PHARMACY (OUTPATIENT)
Dept: ONCOLOGY | Facility: HOSPITAL | Age: 44
End: 2024-02-05
Payer: MEDICARE

## 2024-02-05 NOTE — PROGRESS NOTES
Specialty Pharmacy Refill Coordination Note     Daya is a 43 y.o. female contacted today regarding refills of  Vumerity and Dalfampridine specialty medication(s).    Reviewed and verified with patient:       Specialty medication(s) and dose(s) confirmed: yes    Refill Questions      Flowsheet Row Most Recent Value   Changes to allergies? No   Changes to medications? No   New conditions or infections since last clinic visit No   Unplanned office visit, urgent care, ED, or hospital admission in the last 4 weeks  No   How does patient/caregiver feel medication is working? Very good   Financial problems or insurance changes  No   Since the previous refill, were any specialty medication doses or scheduled injections missed or delayed?  No   Does this patient require a clinical escalation to a pharmacist? No            Delivery Questions      Flowsheet Row Most Recent Value   Delivery method [employer]Ex   Delivery address verified with patient/caregiver? Yes   Delivery address Home   Number of medications in delivery 2   Medication(s) being filled and delivered Dalfampridine (Multiple Sclerosis Agents), Diroximel Fumarate   Doses left of specialty medications Vumerity and Dalfampridine=at least a week   Copay verified? Yes   Copay amount Dalfampridine and Vumerity co-pay $0   Copay form of payment No copayment ($0)              Medication Adherence          Adherence tools used: directed education      Support network for adherence: healthcare provider                Follow-up: 1 month(s)     Lashonda Feliciano, Pharmacy Technician  Specialty Pharmacy Technician

## 2024-02-08 ENCOUNTER — LAB (OUTPATIENT)
Dept: LAB | Facility: HOSPITAL | Age: 44
End: 2024-02-08
Payer: MEDICARE

## 2024-02-08 ENCOUNTER — OFFICE VISIT (OUTPATIENT)
Dept: NEUROLOGY | Facility: CLINIC | Age: 44
End: 2024-02-08
Payer: MEDICARE

## 2024-02-08 VITALS
BODY MASS INDEX: 24.91 KG/M2 | DIASTOLIC BLOOD PRESSURE: 76 MMHG | OXYGEN SATURATION: 98 % | WEIGHT: 155 LBS | SYSTOLIC BLOOD PRESSURE: 122 MMHG | HEART RATE: 98 BPM | HEIGHT: 66 IN

## 2024-02-08 DIAGNOSIS — G35 MULTIPLE SCLEROSIS: Primary | Chronic | ICD-10-CM

## 2024-02-08 DIAGNOSIS — R26.9 GAIT ABNORMALITY: Chronic | ICD-10-CM

## 2024-02-08 DIAGNOSIS — M79.2 NEUROPATHIC PAIN: Chronic | ICD-10-CM

## 2024-02-08 DIAGNOSIS — R25.2 SPASTIC: Chronic | ICD-10-CM

## 2024-02-08 DIAGNOSIS — G35 MULTIPLE SCLEROSIS: ICD-10-CM

## 2024-02-08 LAB
BASOPHILS # BLD AUTO: 0.07 10*3/MM3 (ref 0–0.2)
BASOPHILS NFR BLD AUTO: 1 % (ref 0–1.5)
DEPRECATED RDW RBC AUTO: 37.8 FL (ref 37–54)
EOSINOPHIL # BLD AUTO: 0.27 10*3/MM3 (ref 0–0.4)
EOSINOPHIL NFR BLD AUTO: 3.9 % (ref 0.3–6.2)
ERYTHROCYTE [DISTWIDTH] IN BLOOD BY AUTOMATED COUNT: 12.7 % (ref 12.3–15.4)
HCT VFR BLD AUTO: 45.4 % (ref 34–46.6)
HGB BLD-MCNC: 15.1 G/DL (ref 12–15.9)
IMM GRANULOCYTES # BLD AUTO: 0.04 10*3/MM3 (ref 0–0.05)
IMM GRANULOCYTES NFR BLD AUTO: 0.6 % (ref 0–0.5)
LYMPHOCYTES # BLD AUTO: 1.31 10*3/MM3 (ref 0.7–3.1)
LYMPHOCYTES NFR BLD AUTO: 19.1 % (ref 19.6–45.3)
MCH RBC QN AUTO: 27.7 PG (ref 26.6–33)
MCHC RBC AUTO-ENTMCNC: 33.3 G/DL (ref 31.5–35.7)
MCV RBC AUTO: 83.2 FL (ref 79–97)
MONOCYTES # BLD AUTO: 0.55 10*3/MM3 (ref 0.1–0.9)
MONOCYTES NFR BLD AUTO: 8 % (ref 5–12)
NEUTROPHILS NFR BLD AUTO: 4.61 10*3/MM3 (ref 1.7–7)
NEUTROPHILS NFR BLD AUTO: 67.4 % (ref 42.7–76)
NRBC BLD AUTO-RTO: 0 /100 WBC (ref 0–0.2)
PLATELET # BLD AUTO: 225 10*3/MM3 (ref 140–450)
PMV BLD AUTO: 9.9 FL (ref 6–12)
RBC # BLD AUTO: 5.46 10*6/MM3 (ref 3.77–5.28)
WBC NRBC COR # BLD AUTO: 6.85 10*3/MM3 (ref 3.4–10.8)

## 2024-02-08 PROCEDURE — 80053 COMPREHEN METABOLIC PANEL: CPT

## 2024-02-08 PROCEDURE — 99214 OFFICE O/P EST MOD 30 MIN: CPT | Performed by: PSYCHIATRY & NEUROLOGY

## 2024-02-08 PROCEDURE — 85025 COMPLETE CBC W/AUTO DIFF WBC: CPT

## 2024-02-08 PROCEDURE — 36415 COLL VENOUS BLD VENIPUNCTURE: CPT

## 2024-02-08 RX ORDER — TIZANIDINE 4 MG/1
8 TABLET ORAL NIGHTLY
Qty: 180 TABLET | Refills: 2 | Status: SHIPPED | OUTPATIENT
Start: 2024-02-08

## 2024-02-08 RX ORDER — BACLOFEN 10 MG/1
10 TABLET ORAL 3 TIMES DAILY
Qty: 270 TABLET | Refills: 3 | Status: SHIPPED | OUTPATIENT
Start: 2024-02-08

## 2024-02-08 NOTE — PROGRESS NOTES
Chief Complaint  No chief complaint on file.    Subjective        Daya Ashford presents to [facility] NEUROLOGY  History of Present Illness    43 y.o. female returns in follow up.  Last visit on 3/20/23 continued Vumerity, Zanaflex, baclofen, Ampyra. GBP.       MRI Brain/cervical, 2/16/23 as compared 6/4/21 moderate T2 lesion load without enhancing lesions, C5/6 DDD, no cord lesions.      JCV 1/23/17 1.2      8/21/23 abs lymph 1100, CMP NCS     RRMS     T25FW  6.91 sec       Tolerating Vumerity.       Vision is blurry as day progresses stable      Compliant with Vumerity and tolerating without difficulty.       Gait is spastic and unsteady.       ST memory, attention/concentraion difficulties .      Legs and hands will spasm with cooler weather     Fingers are going numb.        Neuropathic pain          Baclofen 10 mg TID, GBP and Zanaflex controlling pain in neck and back.       Family has CMT.      PMH:     Transferred to Dr Almaguer for RRMS.  Sx started in 2007 loss of vision in right eye for several days.  2008 left hemisensory loss for two weeks in October, then in December 2008 right sided numbness.  Dx 2009 and started on Rebif but had frequent episodes of numbness from waist down.  Swtiched to Tysabri for 6 months.  Then changed to Rebif for a year.  Back on Tysabri for 6 months.  Then back to Rebif for approx 6 years.      UTI and appendicitis in 2014 with appendectomy and since has had frequent UTI's and neurogenic bladders     Progressive decrease in gait and hand dexterity on Rebif.     Reviewed Dr Almaguer' notes:     Sx of muscle spasms, back pain, urinary retention and frequent UTI's.  Currently taking Rebif after taking Tysabri twice and stopped due to JCV positive ab.  Dx 10/2007 optic neuritis.  MRI progression from 2009 to 2012 with new pontine lesion     3/2016 - MRI C-spine, my review, patchy signal in c spine T4/5 enhancing lesion  4/13/16 - MRI T-spine, my review, WNL  Labs  "3/4/16 - cbc, cmp wnl       Objective   Vital Signs:  There were no vitals taken for this visit.  Estimated body mass index is 26.22 kg/m² as calculated from the following:    Height as of 3/20/23: 167.6 cm (65.98\").    Weight as of 3/20/23: 73.7 kg (162 lb 6.4 oz).           Neurologic Exam     Mental Status   Oriented to person, place, and time.   Follows 3 step commands.   Attention: normal. Concentration: normal.   Speech: speech is normal   Level of consciousness: alert  Knowledge: good and consistent with education.   Able to name object. Able to read. Able to repeat. Able to write. Normal comprehension.     Cranial Nerves     CN II   Visual fields full to confrontation.   Visual acuity: normal  Right visual field deficit: none  Left visual field deficit: none     CN III, IV, VI   Pupils are equal, round, and reactive to light.  Extraocular motions are normal.   Right pupil: Shape: regular. Reactivity: brisk. Consensual response: intact.   Left pupil: Shape: regular. Reactivity: brisk. Consensual response: intact.   Nystagmus: none   Diplopia: none  Ophthalmoparesis: none  Upgaze: normal  Downgaze: normal  Conjugate gaze: present  Vestibulo-ocular reflex: present    CN V   Facial sensation intact.   Right corneal reflex: normal  Left corneal reflex: normal    CN VII   Right facial weakness: none  Left facial weakness: none    CN VIII   Hearing: intact    CN IX, X   Palate: symmetric  Right gag reflex: normal  Left gag reflex: normal    CN XI   Right sternocleidomastoid strength: normal  Left sternocleidomastoid strength: normal    CN XII   Tongue: not atrophic  Fasciculations: absent  Tongue deviation: none    Motor Exam   Muscle bulk: normal  Overall muscle tone: increased  Right arm tone: normal  Left arm tone: normal  Right leg tone: spastic  Left leg tone: spastic    Strength   Strength 5/5 except as noted.   Left quadriceps: 4/5  Left hamstrin/5  Left glutei: 4/5  Left anterior tibial: 4/5  Left " posterior tibial: 4/5  Left peroneal: 4/5  Left gastroc: 4/5    Sensory Exam   Light touch normal.     Gait, Coordination, and Reflexes     Gait  Gait: circumduction and spastic    Coordination   Finger to nose coordination: normal    Tremor   Resting tremor: absent  Intention tremor: absent  Action tremor: absent    Reflexes   Reflexes 2+ except as noted.   Right patellar: 3+  Left patellar: 3+  Right achilles: 3+  Left achilles: 3+       Physical Exam  Eyes:      Extraocular Movements: EOM normal.      Pupils: Pupils are equal, round, and reactive to light.   Neurological:      Mental Status: She is oriented to person, place, and time.      Coordination: Finger-Nose-Finger Test normal.      Deep Tendon Reflexes:      Reflex Scores:       Patellar reflexes are 3+ on the right side and 3+ on the left side.       Achilles reflexes are 3+ on the right side and 3+ on the left side.  Psychiatric:         Speech: Speech normal.        Result Review :    The following data was reviewed by: Stefano Panda MD on 02/08/2024:    Data reviewed : Radiologic studies MRI B/C             Assessment and Plan     Diagnoses and all orders for this visit:    1. Multiple sclerosis (Primary)    2. Gait abnormality    3. Spastic    4. Neuropathic pain             Follow Up     No follow-ups on file.  Patient was given instructions and counseling regarding her condition or for health maintenance advice. Please see specific information pulled into the AVS if appropriate.

## 2024-02-09 LAB
ALBUMIN SERPL-MCNC: 4.5 G/DL (ref 3.5–5.2)
ALBUMIN/GLOB SERPL: 1.4 G/DL
ALP SERPL-CCNC: 55 U/L (ref 39–117)
ALT SERPL W P-5'-P-CCNC: 16 U/L (ref 1–33)
ANION GAP SERPL CALCULATED.3IONS-SCNC: 12.5 MMOL/L (ref 5–15)
AST SERPL-CCNC: 17 U/L (ref 1–32)
BILIRUB SERPL-MCNC: 0.2 MG/DL (ref 0–1.2)
BUN SERPL-MCNC: 14 MG/DL (ref 6–20)
BUN/CREAT SERPL: 23.7 (ref 7–25)
CALCIUM SPEC-SCNC: 9.6 MG/DL (ref 8.6–10.5)
CHLORIDE SERPL-SCNC: 105 MMOL/L (ref 98–107)
CO2 SERPL-SCNC: 21.5 MMOL/L (ref 22–29)
CREAT SERPL-MCNC: 0.59 MG/DL (ref 0.57–1)
EGFRCR SERPLBLD CKD-EPI 2021: 113.4 ML/MIN/1.73
GLOBULIN UR ELPH-MCNC: 3.2 GM/DL
GLUCOSE SERPL-MCNC: 82 MG/DL (ref 65–99)
POTASSIUM SERPL-SCNC: 3.9 MMOL/L (ref 3.5–5.2)
PROT SERPL-MCNC: 7.7 G/DL (ref 6–8.5)
SODIUM SERPL-SCNC: 139 MMOL/L (ref 136–145)

## 2024-02-13 DIAGNOSIS — M79.2 NEUROPATHIC PAIN: Chronic | ICD-10-CM

## 2024-02-13 RX ORDER — GABAPENTIN 600 MG/1
600 TABLET ORAL 3 TIMES DAILY
Qty: 270 TABLET | Refills: 1 | Status: SHIPPED | OUTPATIENT
Start: 2024-02-13 | End: 2025-02-12

## 2024-03-04 ENCOUNTER — TELEPHONE (OUTPATIENT)
Dept: NEUROLOGY | Facility: CLINIC | Age: 44
End: 2024-03-04

## 2024-03-04 NOTE — TELEPHONE ENCOUNTER
Caller: Daya Ashford    Relationship: Self    Best call back number: [phone]    What is the best time to reach you: ANY    Who are you requesting to speak with (clinical staff, provider,  specific staff member): ANY    Do you know the name of the person who called: NOT SURE     What was the call regarding: STATES SHE MISSED A CALL LAST WEEK - POSSIBLY FROM THE PHARMACY - REGARDING HER MEDICATIONS. PLEASE ADVISE, THANK YOU.

## 2024-03-05 ENCOUNTER — SPECIALTY PHARMACY (OUTPATIENT)
Dept: ONCOLOGY | Facility: HOSPITAL | Age: 44
End: 2024-03-05
Payer: MEDICARE

## 2024-03-05 NOTE — PROGRESS NOTES
Specialty Pharmacy Refill Coordination Note     Daya is a 43 y.o. female contacted today regarding refills of  Vumerity and Dalfampridine specialty medication(s).    Reviewed and verified with patient:       Specialty medication(s) and dose(s) confirmed: yes    Refill Questions      Flowsheet Row Most Recent Value   Changes to allergies? No   Changes to medications? No   New conditions or infections since last clinic visit No   Unplanned office visit, urgent care, ED, or hospital admission in the last 4 weeks  No   How does patient/caregiver feel medication is working? Very good   Financial problems or insurance changes  No   Since the previous refill, were any specialty medication doses or scheduled injections missed or delayed?  No   Does this patient require a clinical escalation to a pharmacist? No            Delivery Questions      Flowsheet Row Most Recent Value   Delivery method FedEx   Delivery address verified with patient/caregiver? Yes   Delivery address Home   Number of medications in delivery 2   Medication(s) being filled and delivered Dalfampridine (Multiple Sclerosis Agents), Diroximel Fumarate   Doses left of specialty medications Vumerity and Dalfampridine=little over a week   Copay verified? Yes   Copay amount Dalfampridine and Vumerity co-pay $0   Copay form of payment No copayment ($0)              Medication Adherence    Adherence tools used: directed education  Support network for adherence: healthcare provider          Follow-up: 1 month(s)     Lashonda Feliciano, Pharmacy Technician  Specialty Pharmacy Technician

## 2024-03-12 DIAGNOSIS — R25.2 SPASTIC: Chronic | ICD-10-CM

## 2024-03-12 DIAGNOSIS — M79.2 NEUROPATHIC PAIN: Chronic | ICD-10-CM

## 2024-03-12 RX ORDER — BACLOFEN 10 MG/1
10 TABLET ORAL 3 TIMES DAILY
Qty: 270 TABLET | Refills: 3 | Status: SHIPPED | OUTPATIENT
Start: 2024-03-12

## 2024-03-12 RX ORDER — GABAPENTIN 600 MG/1
600 TABLET ORAL 3 TIMES DAILY
Qty: 270 TABLET | Refills: 1 | Status: SHIPPED | OUTPATIENT
Start: 2024-03-12 | End: 2025-03-12

## 2024-04-04 ENCOUNTER — SPECIALTY PHARMACY (OUTPATIENT)
Dept: ONCOLOGY | Facility: HOSPITAL | Age: 44
End: 2024-04-04
Payer: MEDICARE

## 2024-04-20 DIAGNOSIS — M79.2 NEUROPATHIC PAIN: Chronic | ICD-10-CM

## 2024-04-22 RX ORDER — GABAPENTIN 600 MG/1
600 TABLET ORAL 3 TIMES DAILY
Qty: 270 TABLET | OUTPATIENT
Start: 2024-04-22

## 2024-04-22 NOTE — TELEPHONE ENCOUNTER
Rx Refill Note  Requested Prescriptions     Pending Prescriptions Disp Refills    gabapentin (NEURONTIN) 600 MG tablet [Pharmacy Med Name: GABAPENTIN 600 MG Tablet] 270 tablet      Sig: TAKE 1 TABLET THREE TIMES DAILY      Last filled: 3/12/24 90 day with 1 to [facility]. Duplicate request  Last office visit with prescribing clinician: 2/8/2024      Next office visit with prescribing clinician: 8/8/2024     Holly Gonzalez MA  04/22/24, 09:29 EDT

## 2024-04-29 ENCOUNTER — SPECIALTY PHARMACY (OUTPATIENT)
Dept: ONCOLOGY | Facility: HOSPITAL | Age: 44
End: 2024-04-29
Payer: MEDICARE

## 2024-04-29 NOTE — PROGRESS NOTES
Specialty Pharmacy Refill Coordination Note     Daya is a 43 y.o. female contacted today regarding refills of  Dalfampridine and Vumerity specialty medication(s).    Reviewed and verified with patient:       Specialty medication(s) and dose(s) confirmed: yes    Refill Questions      Flowsheet Row Most Recent Value   Changes to allergies? No   Changes to medications? No   New conditions or infections since last clinic visit No   Unplanned office visit, urgent care, ED, or hospital admission in the last 4 weeks  No   How does patient/caregiver feel medication is working? Very good   Financial problems or insurance changes  No   Since the previous refill, were any specialty medication doses or scheduled injections missed or delayed?  No   Does this patient require a clinical escalation to a pharmacist? No            Delivery Questions      Flowsheet Row Most Recent Value   Delivery method FedEx   Delivery address verified with patient/caregiver? Yes   Delivery address Home   Number of medications in delivery 2   Medication(s) being filled and delivered Dalfampridine (Multiple Sclerosis Agents), Diroximel Fumarate   Doses left of specialty medications Vumerity and Dalfampridine=at least a week   Copay verified? Yes   Copay amount Dalfampridine and Vumerity co-pay $0   Copay form of payment No copayment ($0)              Medication Adherence    Adherence tools used: directed education  Support network for adherence: healthcare provider          Follow-up: 1 month     Lashonda Feliciano, Pharmacy Technician  Specialty Pharmacy Technician

## 2024-05-28 ENCOUNTER — SPECIALTY PHARMACY (OUTPATIENT)
Dept: ONCOLOGY | Facility: HOSPITAL | Age: 44
End: 2024-05-28
Payer: MEDICARE

## 2024-06-24 ENCOUNTER — SPECIALTY PHARMACY (OUTPATIENT)
Dept: ONCOLOGY | Facility: HOSPITAL | Age: 44
End: 2024-06-24
Payer: MEDICARE

## 2024-06-24 ENCOUNTER — TELEPHONE (OUTPATIENT)
Dept: NEUROLOGY | Facility: CLINIC | Age: 44
End: 2024-06-24

## 2024-06-24 NOTE — PROGRESS NOTES
Specialty Pharmacy Patient Management Program  Neurology Reassessment     Daya Ashford is a 43 y.o. female with multiple sclerosis seen by a Neurology provider and enrolled in the Neurology Patient Management program offered by [facility].  A follow-up outreach was conducted, including assessment of continued therapy appropriateness, medication adherence, and side effect incidence and management for dalfampridine 10 mg bid, vumerity 231 mg bid.     Changes to Insurance Coverage or Financial Support  N/a , no changes     Relevant Past Medical History and Comorbidities  Relevant medical history and concomitant health conditions were discussed with the patient. The patient's chart has been reviewed for relevant past medical history and comorbid health conditions and updated as necessary.   Past Medical History:   Diagnosis Date    MS (multiple sclerosis)      Social History     Socioeconomic History    Marital status:    Tobacco Use    Smoking status: Every Day     Current packs/day: 0.50     Average packs/day: 0.5 packs/day for 15.0 years (7.5 ttl pk-yrs)     Types: Cigarettes     Passive exposure: Current    Smokeless tobacco: Never   Vaping Use    Vaping status: Never Used   Substance and Sexual Activity    Alcohol use: No    Drug use: Never    Sexual activity: Yes     Partners: Male     Birth control/protection: None     Problem list reviewed by Barney Chand, PharmD on 6/24/2024 at  2:10 PM    Hospitalizations and Urgent Care Since Last Assessment  ED Visits, Admissions, or Hospitalizations: none   Urgent Office Visits: none     Allergies  Known allergies and reactions were discussed with the patient. The patient's chart has been reviewed for allergy information and updated as necessary.   No Known Allergies  Allergies reviewed by Barney Chand, PharmD on 6/24/2024 at  2:09 PM    Relevant Laboratory Values  Common labs          2/8/2024    14:13   Common Labs    Glucose 82    BUN 14    Creatinine 0.59    Sodium 139    Potassium 3.9    Chloride 105    Calcium 9.6    Albumin 4.5    Total Bilirubin 0.2    Alkaline Phosphatase 55    AST (SGOT) 17    ALT (SGPT) 16    WBC 6.85    Hemoglobin 15.1    Hematocrit 45.4    Platelets 225        Lab Assessment   The above labs have been reviewed. No dose adjustments are needed for the specialty medication(s) based on the labs. Labs due with next appoinment in August    Current Medication List  This medication list has been reviewed with the patient and evaluated for any interactions or necessary modifications/recommendations, and updated to include all prescription medications, OTC medications, and supplements the patient is currently taking.  This list reflects what is contained in the patient's profile, which has also been marked as reviewed to communicate to other providers it is the most up to date version of the patient's current medication therapy.     Current Outpatient Medications:     baclofen (LIORESAL) 10 MG tablet, Take 1 tablet by mouth 3 (Three) Times a Day., Disp: 270 tablet, Rfl: 3    cyclobenzaprine (FLEXERIL) 10 MG tablet, TAKE 1 TABLET TWICE DAILY AS NEEDED FOR MUSCLE SPASM(S), Disp: 180 tablet, Rfl: 3    Dalfampridine ER 10 MG tablet sustained-release 12 hour, Take 1 tablet by mouth 2 (Two) Times a Day., Disp: 60 tablet, Rfl: 11    Diroximel Fumarate (Vumerity) 231 MG capsule delayed-release, Take 2 capsules by mouth 2 (Two) Times a Day., Disp: 120 capsule, Rfl: 11    famotidine (PEPCID) 20 MG tablet, TAKE 1 TABLET TWICE DAILY, Disp: 180 tablet, Rfl: 3    gabapentin (NEURONTIN) 600 MG tablet, Take 1 tablet by mouth 3 (Three) Times a Day., Disp: 270 tablet, Rfl: 1    tiZANidine (ZANAFLEX) 4 MG tablet, Take 2 tablets by mouth Every Night., Disp: 180 tablet, Rfl: 2    Medicines reviewed by Barney Chand, PharmD on 6/24/2024 at  2:10 PM    Drug Interactions  No relevant drug drug interactions with specialty  medication.       Adverse Drug Reactions  Medication tolerability: Tolerating with no to minimal ADRs          Medication plan: Continue therapy with normal follow-up  Plan for ADR Management: patient reporting      Adherence, Self-Administration, and Current Therapy Problems  Adherence related patient's specialty therapy was discussed with the patient. The Adherence segment of this outreach has been reviewed and updated.   Adherence Questions  Linked Medication(s) Assessed: Dalfampridine (Multiple Sclerosis Agents), Diroximel Fumarate  On average, how many doses/injections does the patient miss per month?: 1  What are the identified reasons for non-adherence or missed doses? : no problems identified  What is the estimated medication adherence level?: <70% (she has had it filled 2/6, 3/6, 4/4, 4/30, 5/29, and now 6/25, so last 5 fills were on time.)  Based on the patient/caregiver response and refill history, does this patient require an MTP to track adherence improvements?: no (she has had it filled 2/6, 3/6, 4/4, 4/30, 5/29, and now 6/25, so last 5 fills were on time.)    Additional Barriers to Patient Self-Administration: none  Methods for Supporting Patient Self-Administration: n/a    Recently Close Medication Therapy Problems  No medication therapy recommendations to display  Open Medication Therapy Problems  No medication therapy recommendations to display     Goals of Therapy  Goals related to the patient's specialty therapy was discussed with the patient. The Patient Goals segment of this outreach has been reviewed and updated.    Goals Addressed Today        Specialty Pharmacy General Goal      Vumerity:  Reduce the number of relapses, delay progression of disability, and limit new disease activity (as seen on MRI).  Dalfampfidine: Improved  Timed 25-Foot Walk (T25-FW)                 Quality of Life Assessment   Quality of Life related to the patient's enrollment in the patient management program and  services provided was discussed with the patient. The QOL segment of this outreach has been reviewed and updated.   Quality of Life Improvement Scale: 7-Somewhat better    Reassessment Plan & Follow-Up  Medication Therapy Changes: continue with dalfampridine 10 mg bid, vumerity 231 mg bid.  Related Plans, Therapy Recommendations, or Issues to Be Addressed: n/a  Pharmacist to perform regular reassessments no more than (6) months from the previous assessment.  Care Coordinator to set up future refill outreaches, coordinate prescription delivery, and escalate clinical questions to pharmacist.     Attestation  Therapeutic appropriateness: Appropriate  I attest the patient was actively involved in and has agreed to the above plan of care. If the prescribed therapy is at any point deemed not appropriate based on the current or future assessments, a consultation will be initiated with the patient's specialty care provider to determine the best course of action. The revised plan of therapy will be documented along with any additional patient education provided. Discussed aforementioned material with patient by phone.    Barney Chand, PharmD, [facility]  Clinic Specialty Pharmacist, Neurology  6/24/2024  14:18 EDT

## 2024-06-24 NOTE — PROGRESS NOTES
Specialty Pharmacy Refill Coordination Note     Daya is a 43 y.o. female contacted today regarding refills of her specialty medication(s).    Specialty medication(s) and dose(s) confirmed: Vumerity 231 mg bid, dalfampridine 10 mg bid.  Changes to medications: no  Changes to insurance: no  Reviewed and verified with patient:  Allergies  Meds  Problems         Refill Questions      Flowsheet Row Most Recent Value   Changes to allergies? No   Changes to medications? No   New conditions or infections since last clinic visit No   Unplanned office visit, urgent care, ED, or hospital admission in the last 4 weeks  No   How does patient/caregiver feel medication is working? Good   Financial problems or insurance changes  No   Since the previous refill, were any specialty medication doses or scheduled injections missed or delayed?  No   Does this patient require a clinical escalation to a pharmacist? No            Delivery Questions      Flowsheet Row Most Recent Value   Delivery method FedEx   Delivery address verified with patient/caregiver? Yes   Delivery address Home   Number of medications in delivery 2   Medication(s) being filled and delivered Dalfampridine (Multiple Sclerosis Agents), Diroximel Fumarate   Doses left of specialty medications few   Copay verified? Yes   Copay amount 0$ for Vumerity and daflpramidrine   Copay form of payment No copayment ($0)            Medication Adherence    Adherence tools used: directed education  Support network for adherence: healthcare provider          Follow-up: 4 week(s)     Barney Chand, PharmD  6/24/2024   14:17 EDT

## 2024-06-24 NOTE — TELEPHONE ENCOUNTER
Caller: Daya Ashford    Relationship: Self    Best call back number: [phone]    What is the best time to reach you: ANYTIME    Who are you requesting to speak with (clinical staff, provider,  specific staff member): PHARMACY  What was the call regarding: PATIENT NEEDS TO SET UP DELIVERY FOR HER MEDICATIONS. WE TRIED TO CONNECT BUT UNAVAILABLE.     PLEASE REVIEW  THANK YOU

## 2024-07-18 ENCOUNTER — SPECIALTY PHARMACY (OUTPATIENT)
Dept: ONCOLOGY | Facility: HOSPITAL | Age: 44
End: 2024-07-18
Payer: MEDICARE

## 2024-07-18 NOTE — PROGRESS NOTES
Specialty Pharmacy Refill Coordination Note     Daya is a 44 y.o. female contacted today regarding refills of  Vumerity and Dalfampridine specialty medication(s).    Reviewed and verified with patient:       Specialty medication(s) and dose(s) confirmed: yes    Refill Questions      Flowsheet Row Most Recent Value   Changes to allergies? No   Changes to medications? No   New conditions or infections since last clinic visit No   Unplanned office visit, urgent care, ED, or hospital admission in the last 4 weeks  No   How does patient/caregiver feel medication is working? Good   Financial problems or insurance changes  No   Since the previous refill, were any specialty medication doses or scheduled injections missed or delayed?  No   Does this patient require a clinical escalation to a pharmacist? No            Delivery Questions      Flowsheet Row Most Recent Value   Delivery method FedEx   Delivery address verified with patient/caregiver? Yes   Delivery address Home   Number of medications in delivery 2   Medication(s) being filled and delivered Dalfampridine (Multiple Sclerosis Agents), Diroximel Fumarate   Doses left of specialty medications probably 2 weeks   Copay verified? Yes   Copay amount 0$ for Vumerity and daflpramidrine   Copay form of payment No copayment ($0)   Ship Date 7/22   Delivery Date 7/23   Signature Required No              Medication Adherence    Adherence tools used: directed education  Support network for adherence: healthcare provider          Follow-up: 1 month(s)     Lashonda Feliciano, Pharmacy Technician  Specialty Pharmacy Technician

## 2024-07-31 DIAGNOSIS — R25.2 SPASTIC: Chronic | ICD-10-CM

## 2024-07-31 DIAGNOSIS — G35 MULTIPLE SCLEROSIS: ICD-10-CM

## 2024-08-01 RX ORDER — TIZANIDINE 4 MG/1
8 TABLET ORAL NIGHTLY
Qty: 180 TABLET | Refills: 3 | Status: SHIPPED | OUTPATIENT
Start: 2024-08-01

## 2024-08-01 RX ORDER — FAMOTIDINE 20 MG/1
TABLET, FILM COATED ORAL
Qty: 180 TABLET | Refills: 3 | Status: SHIPPED | OUTPATIENT
Start: 2024-08-01

## 2024-08-01 NOTE — TELEPHONE ENCOUNTER
Rx Refill Note  Requested Prescriptions     Pending Prescriptions Disp Refills    famotidine (PEPCID) 20 MG tablet [Pharmacy Med Name: FAMOTIDINE 20 MG Tablet] 180 tablet 3     Sig: TAKE 1 TABLET TWICE DAILY    tiZANidine (ZANAFLEX) 4 MG tablet [Pharmacy Med Name: TIZANIDINE HYDROCHLORIDE 4 MG Tablet] 180 tablet 3     Sig: TAKE 2 TABLETS EVERY NIGHT AT BEDTIME      Last filled: 7/17/2023, 180 with 3 Refills.   famotidine (PEPCID)   Last filled: 2/8/2024, 180 with 2 Refills.   tiZANidine (ZANAFLEX)   Last office visit with prescribing clinician: 2/8/2024      Next office visit with prescribing clinician: 10/17/2024     Dorinda Roca MA  08/01/24, 09:54 EDT

## 2024-08-05 ENCOUNTER — SPECIALTY PHARMACY (OUTPATIENT)
Dept: ONCOLOGY | Facility: HOSPITAL | Age: 44
End: 2024-08-05
Payer: MEDICARE

## 2024-08-05 DIAGNOSIS — G35 MULTIPLE SCLEROSIS: Primary | Chronic | ICD-10-CM

## 2024-08-14 DIAGNOSIS — M79.2 NEUROPATHIC PAIN: Chronic | ICD-10-CM

## 2024-08-14 RX ORDER — GABAPENTIN 600 MG/1
600 TABLET ORAL 3 TIMES DAILY
Qty: 270 TABLET | Refills: 1 | Status: SHIPPED | OUTPATIENT
Start: 2024-08-14 | End: 2025-08-14

## 2024-08-14 NOTE — TELEPHONE ENCOUNTER
Caller: Daya Ashford    Relationship: Self    Best call back number: [phone]    Requested Prescriptions:   Requested Prescriptions     Pending Prescriptions Disp Refills    gabapentin (NEURONTIN) 600 MG tablet 270 tablet 1     Sig: Take 1 tablet by mouth 3 (Three) Times a Day.        Pharmacy where request should be sent:    [facility] IN [place]    Last office visit with prescribing clinician: 2/8/2024   Last telemedicine visit with prescribing clinician: Visit date not found   Next office visit with prescribing clinician: 10/17/2024     Additional details provided by patient:     Does the patient have less than a 3 day supply:  [] Yes  [x] No    Would you like a call back once the refill request has been completed: [] Yes [] No    If the office needs to give you a call back, can they leave a voicemail: [] Yes [] No    Anita Monte Rep   08/14/24 12:22 EDT

## 2024-08-14 NOTE — TELEPHONE ENCOUNTER
Rx Refill Note  Requested Prescriptions     Pending Prescriptions Disp Refills    gabapentin (NEURONTIN) 600 MG tablet 270 tablet 1     Sig: Take 1 tablet by mouth 3 (Three) Times a Day.      Last filled:  03/12/24 w/1  Last office visit with prescribing clinician: 2/8/2024      Next office visit with prescribing clinician: 10/17/2024     Marcelina Anderson MA  08/14/24, 15:23 EDT

## 2024-08-22 ENCOUNTER — SPECIALTY PHARMACY (OUTPATIENT)
Dept: PHARMACY | Facility: HOSPITAL | Age: 44
End: 2024-08-22
Payer: MEDICARE

## 2024-08-22 NOTE — PROGRESS NOTES
Specialty Pharmacy Refill Coordination Note     Daya is a 44 y.o. female contacted today regarding refills of  Vumerity and Dalfampridine specialty medication(s).    Reviewed and verified with patient:       Specialty medication(s) and dose(s) confirmed: yes    Refill Questions      Flowsheet Row Most Recent Value   Changes to allergies? No   Changes to medications? No   New conditions or infections since last clinic visit No   Unplanned office visit, urgent care, ED, or hospital admission in the last 4 weeks  No   How does patient/caregiver feel medication is working? Good   Financial problems or insurance changes  No   Since the previous refill, were any specialty medication doses or scheduled injections missed or delayed?  No   Does this patient require a clinical escalation to a pharmacist? No            Delivery Questions      Flowsheet Row Most Recent Value   Delivery method FedEx   Delivery address verified with patient/caregiver? Yes   Delivery address Home   Number of medications in delivery 2   Medication(s) being filled and delivered Dalfampridine (Multiple Sclerosis Agents), Diroximel Fumarate   Doses left of specialty medications Dalfampridine/Vumerity=little over a week   Copay verified? Yes   Copay amount 0$ for Vumerity and daflpramidrine   Copay form of payment No copayment ($0)   Ship Date 8/26   Delivery Date 8/27   Signature Required No              Medication Adherence    Adherence tools used: directed education  Support network for adherence: healthcare provider          Follow-up: 1 month(s)     Lashonda Feliciano, Pharmacy Technician  Specialty Pharmacy Technician

## 2024-09-19 ENCOUNTER — SPECIALTY PHARMACY (OUTPATIENT)
Dept: ONCOLOGY | Facility: HOSPITAL | Age: 44
End: 2024-09-19
Payer: MEDICARE

## 2024-10-11 RX ORDER — CYCLOBENZAPRINE HCL 10 MG
TABLET ORAL
Qty: 180 TABLET | Refills: 3 | Status: SHIPPED | OUTPATIENT
Start: 2024-10-11

## 2024-10-11 NOTE — TELEPHONE ENCOUNTER
Rx Refill Note  Requested Prescriptions     Pending Prescriptions Disp Refills    cyclobenzaprine (FLEXERIL) 10 MG tablet [Pharmacy Med Name: Cyclobenzaprine HCl Oral Tablet 10 MG] 180 tablet 3     Sig: TAKE 1 TABLET TWICE DAILY AS NEEDED FOR MUSCLE SPASM(S)      Last filled:  09/15/23 w/3  Last office visit with prescribing clinician: 2/8/2024      Next office visit with prescribing clinician: 10/17/2024     Marcelina Anderson MA  10/11/24, 08:00 EDT

## 2024-10-17 ENCOUNTER — LAB (OUTPATIENT)
Dept: LAB | Facility: HOSPITAL | Age: 44
End: 2024-10-17
Payer: MEDICARE

## 2024-10-17 ENCOUNTER — SPECIALTY PHARMACY (OUTPATIENT)
Dept: ONCOLOGY | Facility: HOSPITAL | Age: 44
End: 2024-10-17
Payer: MEDICARE

## 2024-10-17 ENCOUNTER — OFFICE VISIT (OUTPATIENT)
Dept: NEUROLOGY | Facility: CLINIC | Age: 44
End: 2024-10-17
Payer: MEDICARE

## 2024-10-17 VITALS
BODY MASS INDEX: 25.88 KG/M2 | SYSTOLIC BLOOD PRESSURE: 140 MMHG | OXYGEN SATURATION: 97 % | WEIGHT: 161 LBS | HEART RATE: 60 BPM | DIASTOLIC BLOOD PRESSURE: 82 MMHG | HEIGHT: 66 IN

## 2024-10-17 DIAGNOSIS — G35 MULTIPLE SCLEROSIS: ICD-10-CM

## 2024-10-17 DIAGNOSIS — M79.2 NEUROPATHIC PAIN: Chronic | ICD-10-CM

## 2024-10-17 DIAGNOSIS — R26.9 GAIT ABNORMALITY: Chronic | ICD-10-CM

## 2024-10-17 DIAGNOSIS — G35 MULTIPLE SCLEROSIS: Primary | Chronic | ICD-10-CM

## 2024-10-17 DIAGNOSIS — R25.2 SPASTIC: Chronic | ICD-10-CM

## 2024-10-17 LAB
ALBUMIN SERPL-MCNC: 4.5 G/DL (ref 3.5–5.2)
ALBUMIN/GLOB SERPL: 1.4 G/DL
ALP SERPL-CCNC: 58 U/L (ref 39–117)
ALT SERPL W P-5'-P-CCNC: 14 U/L (ref 1–33)
ANION GAP SERPL CALCULATED.3IONS-SCNC: 12 MMOL/L (ref 5–15)
AST SERPL-CCNC: 14 U/L (ref 1–32)
BASOPHILS # BLD AUTO: 0.06 10*3/MM3 (ref 0–0.2)
BASOPHILS NFR BLD AUTO: 0.8 % (ref 0–1.5)
BILIRUB SERPL-MCNC: 0.3 MG/DL (ref 0–1.2)
BUN SERPL-MCNC: 10 MG/DL (ref 6–20)
BUN/CREAT SERPL: 13.7 (ref 7–25)
CALCIUM SPEC-SCNC: 10 MG/DL (ref 8.6–10.5)
CHLORIDE SERPL-SCNC: 101 MMOL/L (ref 98–107)
CO2 SERPL-SCNC: 26 MMOL/L (ref 22–29)
CREAT SERPL-MCNC: 0.73 MG/DL (ref 0.57–1)
DEPRECATED RDW RBC AUTO: 39.2 FL (ref 37–54)
EGFRCR SERPLBLD CKD-EPI 2021: 102.9 ML/MIN/1.73
EOSINOPHIL # BLD AUTO: 0.16 10*3/MM3 (ref 0–0.4)
EOSINOPHIL NFR BLD AUTO: 2 % (ref 0.3–6.2)
ERYTHROCYTE [DISTWIDTH] IN BLOOD BY AUTOMATED COUNT: 12.7 % (ref 12.3–15.4)
GLOBULIN UR ELPH-MCNC: 3.3 GM/DL
GLUCOSE SERPL-MCNC: 87 MG/DL (ref 65–99)
HCT VFR BLD AUTO: 47 % (ref 34–46.6)
HGB BLD-MCNC: 15.4 G/DL (ref 12–15.9)
IMM GRANULOCYTES # BLD AUTO: 0.1 10*3/MM3 (ref 0–0.05)
IMM GRANULOCYTES NFR BLD AUTO: 1.3 % (ref 0–0.5)
LYMPHOCYTES # BLD AUTO: 1.3 10*3/MM3 (ref 0.7–3.1)
LYMPHOCYTES NFR BLD AUTO: 16.4 % (ref 19.6–45.3)
MCH RBC QN AUTO: 28 PG (ref 26.6–33)
MCHC RBC AUTO-ENTMCNC: 32.8 G/DL (ref 31.5–35.7)
MCV RBC AUTO: 85.5 FL (ref 79–97)
MONOCYTES # BLD AUTO: 0.47 10*3/MM3 (ref 0.1–0.9)
MONOCYTES NFR BLD AUTO: 5.9 % (ref 5–12)
NEUTROPHILS NFR BLD AUTO: 5.83 10*3/MM3 (ref 1.7–7)
NEUTROPHILS NFR BLD AUTO: 73.6 % (ref 42.7–76)
NRBC BLD AUTO-RTO: 0 /100 WBC (ref 0–0.2)
PLATELET # BLD AUTO: 215 10*3/MM3 (ref 140–450)
PMV BLD AUTO: 9.8 FL (ref 6–12)
POTASSIUM SERPL-SCNC: 4.3 MMOL/L (ref 3.5–5.2)
PROT SERPL-MCNC: 7.8 G/DL (ref 6–8.5)
RBC # BLD AUTO: 5.5 10*6/MM3 (ref 3.77–5.28)
SODIUM SERPL-SCNC: 139 MMOL/L (ref 136–145)
WBC NRBC COR # BLD AUTO: 7.92 10*3/MM3 (ref 3.4–10.8)

## 2024-10-17 PROCEDURE — 85025 COMPLETE CBC W/AUTO DIFF WBC: CPT

## 2024-10-17 PROCEDURE — 80053 COMPREHEN METABOLIC PANEL: CPT

## 2024-10-17 RX ORDER — BACLOFEN 10 MG/1
10 TABLET ORAL 3 TIMES DAILY
Qty: 270 TABLET | Refills: 3 | Status: SHIPPED | OUTPATIENT
Start: 2024-10-17

## 2024-10-17 RX ORDER — CIPROFLOXACIN 500 MG/1
1 TABLET, FILM COATED ORAL EVERY 12 HOURS SCHEDULED
COMMUNITY
Start: 2024-10-14

## 2024-10-17 RX ORDER — GABAPENTIN 600 MG/1
600 TABLET ORAL 3 TIMES DAILY
Qty: 270 TABLET | Refills: 1 | Status: SHIPPED | OUTPATIENT
Start: 2024-10-17 | End: 2025-10-17

## 2024-10-17 NOTE — PROGRESS NOTES
Specialty Pharmacy Patient Management Program  Neurology Reassessment     Daya Ashford is a 44 y.o. female with multiple sclerosis seen by a Neurology provider and enrolled in the Neurology Patient Management program offered by [facility].  A follow-up outreach was conducted, including assessment of continued therapy appropriateness, medication adherence, and side effect incidence and management for Vumerity and dalfampridine.     Changes to Insurance Coverage or Financial Support  [employer]     Relevant Past Medical History and Comorbidities  Relevant medical history and concomitant health conditions were discussed with the patient. The patient's chart has been reviewed for relevant past medical history and comorbid health conditions and updated as necessary.   Past Medical History:   Diagnosis Date    MS (multiple sclerosis)      Social History     Socioeconomic History    Marital status:    Tobacco Use    Smoking status: Every Day     Current packs/day: 0.50     Average packs/day: 0.5 packs/day for 15.0 years (7.5 ttl pk-yrs)     Types: Cigarettes     Passive exposure: Current    Smokeless tobacco: Never   Vaping Use    Vaping status: Never Used   Substance and Sexual Activity    Alcohol use: No    Drug use: Never    Sexual activity: Yes     Partners: Male     Birth control/protection: None     Problem list reviewed by Jeannie Crow, PharmD on 10/17/2024 at 11:40 AM    Hospitalizations and Urgent Care Since Last Assessment  ED Visits, Admissions, or Hospitalizations: none   Urgent Office Visits: none     Allergies  Known allergies and reactions were discussed with the patient. The patient's chart has been reviewed for allergy information and updated as necessary.   No Known Allergies  Allergies reviewed by Jeannie Crow, PharmD on 10/17/2024 at 11:39 AM    Relevant Laboratory Values  Common labs          2/8/2024    14:13   Common Labs   Glucose 82    BUN 14     Creatinine 0.59    Sodium 139    Potassium 3.9    Chloride 105    Calcium 9.6    Albumin 4.5    Total Bilirubin 0.2    Alkaline Phosphatase 55    AST (SGOT) 17    ALT (SGPT) 16    WBC 6.85    Hemoglobin 15.1    Hematocrit 45.4    Platelets 225        Lab Assessment  The above labs have been reviewed. No dose adjustments are needed for the specialty medication(s) based on the labs.     Current Medication List  This medication list has been reviewed with the patient and evaluated for any interactions or necessary modifications/recommendations, and updated to include all prescription medications, OTC medications, and supplements the patient is currently taking.  This list reflects what is contained in the patient's profile, which has also been marked as reviewed to communicate to other providers it is the most up to date version of the patient's current medication therapy.     Current Outpatient Medications:     baclofen (LIORESAL) 10 MG tablet, Take 1 tablet by mouth 3 (Three) Times a Day., Disp: 270 tablet, Rfl: 3    ciprofloxacin (CIPRO) 500 MG tablet, Take 1 tablet by mouth Every 12 (Twelve) Hours., Disp: , Rfl:     cyclobenzaprine (FLEXERIL) 10 MG tablet, TAKE 1 TABLET TWICE DAILY AS NEEDED FOR MUSCLE SPASM(S), Disp: 180 tablet, Rfl: 3    Dalfampridine ER 10 MG tablet sustained-release 12 hour, Take 1 tablet by mouth 2 (Two) Times a Day., Disp: 60 tablet, Rfl: 11    Diroximel Fumarate (Vumerity) 231 MG capsule delayed-release, Take 2 capsules by mouth 2 (Two) Times a Day., Disp: 120 capsule, Rfl: 11    famotidine (PEPCID) 20 MG tablet, TAKE 1 TABLET TWICE DAILY, Disp: 180 tablet, Rfl: 3    gabapentin (NEURONTIN) 600 MG tablet, Take 1 tablet by mouth 3 (Three) Times a Day., Disp: 270 tablet, Rfl: 1    tiZANidine (ZANAFLEX) 4 MG tablet, Take 2 tablets by mouth Every Night., Disp: 180 tablet, Rfl: 3    Medicines reviewed by Jeannie Crow, PharmD on 10/17/2024 at 11:39 AM    Drug Interactions  No relevant  drug-drug interactions with specialty medication(s):  Vumerity and dalfampridine.        Adverse Drug Reactions  Medication tolerability: Tolerating with no to minimal ADRs          Medication plan: Continue therapy with normal follow-up  Plan for ADR Management: not required      Adherence, Self-Administration, and Current Therapy Problems  Adherence related patient's specialty therapy was discussed with the patient. The Adherence segment of this outreach has been reviewed and updated.   Adherence Questions  Linked Medication(s) Assessed: Dalfampridine, Diroximel Fumarate (Vumerity)  On average, how many doses/injections does the patient miss per month?: 0  What are the identified reasons for non-adherence or missed doses? : no problems identified  What is the estimated medication adherence level?: %  Based on the patient/caregiver response and refill history, does this patient require an MTP to track adherence improvements?: no    Additional Barriers to Patient Self-Administration: none  Methods for Supporting Patient Self-Administration: n/a    Recently Close Medication Therapy Problems  No medication therapy recommendations to display  Open Medication Therapy Problems  No medication therapy recommendations to display     Goals of Therapy  Goals related to the patient's specialty therapy was discussed with the patient. The Patient Goals segment of this outreach has been reviewed and updated.    Goals Addressed Today        Specialty Pharmacy General Goal      Vumerity:  Reduce the number of relapses, delay progression of disability, and limit new disease activity (as seen on MRI).  Dalfampfidine: Improved  Timed 25-Foot Walk (T25-FW) of 9.69 on 3/20/23  10/17/24 - walking speed/gait stable, no signs/sx of relapse, and not changes seen on MRIs on 2/20/23 compared with the prior year.               Quality of Life Assessment   Quality of Life related to the patient's enrollment in the patient management  program and services provided was discussed with the patient. The QOL segment of this outreach has been reviewed and updated.   Quality of Life Improvement Scale: 7-Somewhat better    Reassessment Plan & Follow-Up  Medication Therapy Changes: continue Vumerity 462 mg po bid and dalfampridine 10mg po bid  Related Plans, Therapy Recommendations, or Issues to Be Addressed: none  Pharmacist to perform regular reassessments no more than (6) months from the previous assessment.  Care Coordinator to set up future refill outreaches, coordinate prescription delivery, and escalate clinical questions to pharmacist.     Attestation  Therapeutic appropriateness: Appropriate  I attest the patient was actively involved in and has agreed to the above plan of care. If the prescribed therapy is at any point deemed not appropriate based on the current or future assessments, a consultation will be initiated with the patient's specialty care provider to determine the best course of action. The revised plan of therapy will be documented along with any additional patient education provided. Discussed aforementioned material with patient in person.    Jeannie Crow PharmD, [facility]  Clinic Specialty Pharmacist, Neurology  10/17/2024  11:47 EDT

## 2024-10-17 NOTE — PROGRESS NOTES
Specialty Pharmacy Patient Management Program  [facility]      Daya is a 44 y.o. female contacted today regarding refills of her medication(s).    Specialty medication(s) and dose(s) confirmed: Vumerity and dalfampridine  Other medications being refilled: none    Refill Questions      Flowsheet Row Most Recent Value   Changes to allergies? No   Changes to medications? No   New conditions or infections since last clinic visit No   Unplanned office visit, urgent care, ED, or hospital admission in the last 4 weeks  No   How does patient/caregiver feel medication is working? Good   Financial problems or insurance changes  No   Since the previous refill, were any specialty medication doses or scheduled injections missed or delayed?  No   Does this patient require a clinical escalation to a pharmacist? No            Delivery Questions      Flowsheet Row Most Recent Value   Delivery method FedEx   Delivery address verified with patient/caregiver? Yes   Delivery address Home   Number of medications in delivery 2   Medication(s) being filled and delivered Dalfampridine, Diroximel Fumarate (Vumerity)   Doses left of specialty medications Dalfampridine/Vumerity=almost 2 weeks   Copay verified? Yes   Copay amount 0$ for Vumerity and daflpramidrine   Copay form of payment No copayment ($0)   Ship Date 10/17   Delivery Date 10/18   Signature Required No            Medication Adherence    Adherence tools used: directed education  Support network for adherence: healthcare provider            Follow-up: 25 days     Radha RochaD  Specialty Pharmacist  10/17/2024  11:39 EDT

## 2024-10-17 NOTE — PROGRESS NOTES
Chief Complaint    Multiple Sclerosis and Peripheral Neuropathy    Subjective        Daya Ashford presents to [facility] NEUROLOG[facility]  History of Present Illness    44 y.o. female returns in follow up.  Last visit on 2/8/24 continued Vumerity, Zanaflex, baclofen, Ampyra. GBP, ordered labs.        MRI Brain/cervical, 2/16/23 as compared 6/4/21 moderate T2 lesion load without enhancing lesions, C5/6 DDD, no cord lesions.      JCV 1/23/17 1.2      8/21/23 abs lymph 1100, CMP NCS     RRMS     T25FW  6.91 sec       Tolerating Vumerity.       Vision is blurry.      Compliant with Vumerity and tolerating without difficulty.       Gait is spastic and unsteady.  Left toe is catching the ground.      ST memory, attention/concentraion difficulties .      Legs and hands will spasm.    Fingers are going numb.        Neuropathic pain      Baclofen 10 mg TID, GBP and Zanaflex controlling pain in neck and back.       Family has CMT.      PMH:     Transferred to Dr Almaguer for RRMS.  Sx started in 2007 loss of vision in right eye for several days.  2008 left hemisensory loss for two weeks in October, then in December 2008 right sided numbness.  Dx 2009 and started on Rebif but had frequent episodes of numbness from waist down.  Swtiched to Tysabri for 6 months.  Then changed to Rebif for a year.  Back on Tysabri for 6 months.  Then back to Rebif for approx 6 years.      UTI and appendicitis in 2014 with appendectomy and since has had frequent UTI's and neurogenic bladders     Progressive decrease in gait and hand dexterity on Rebif.     Reviewed Dr Almaguer' notes:     Sx of muscle spasms, back pain, urinary retention and frequent UTI's.  Currently taking Rebif after taking Tysabri twice and stopped due to JCV positive ab.  Dx 10/2007 optic neuritis.  MRI progression from 2009 to 2012 with new pontine lesion     3/2016 - MRI C-spine, my review, patchy signal in c spine T4/5 enhancing lesion  4/13/16 - MRI T-spine, my  "review, WNL  Labs 3/4/16 - cbc, cmp wnl    Objective   Vital Signs:  /82   Pulse 60   Ht 167.6 cm (65.98\")   Wt 73 kg (161 lb)   SpO2 97%   BMI 26.00 kg/m²   Estimated body mass index is 26 kg/m² as calculated from the following:    Height as of this encounter: 167.6 cm (65.98\").    Weight as of this encounter: 73 kg (161 lb).        Neurological Exam  Mental Status  Awake, alert and oriented to person, place and time. Oriented to person, place and time. Speech is normal. Language is fluent with no aphasia. Fund of knowledge is appropriate for level of education.    Cranial Nerves  CN III, IV, VI: Extraocular movements intact bilaterally. Pupils equal round and reactive to light bilaterally.  CN V: Facial sensation is normal.  CN VII: Full and symmetric facial movement.  CN IX, X: Palate elevates symmetrically  CN XI: Shoulder shrug strength is normal.  CN XII: Tongue midline without atrophy or fasciculations.    Motor                                               Right                     Left   Iliopsoas                               4                          4   Quadriceps                           4                          4   Hamstring                             4                          4   Gastrocnemius                     4                           4   Anterior tibialis                      3+                          3+   Posterior tibialis                    3+                          3+   Peroneal                               3+                          3+    Sensory  Sensation is intact to light touch, pinprick, vibration and proprioception in all four extremities.    Reflexes                                            Right                      Left  Brachioradialis                    3+                         3+  Biceps                                 3+                         3+  Triceps                                3+                         3+  Finger flex                       "     3+                         3+  Hamstring                            3+                         3+  Patellar                                4+                         4+  Achilles                                4+                         4+    Coordination    Finger-to-nose, rapid alternating movements and heel-to-shin normal bilaterally without dysmetria.    Gait  Casual gait: Spastic gait.       Physical Exam  Eyes:      Extraocular Movements: Extraocular movements intact.      Pupils: Pupils are equal, round, and reactive to light.   Neurological:      Coordination: Coordination is intact.      Deep Tendon Reflexes:      Reflex Scores:       Tricep reflexes are 3+ on the right side and 3+ on the left side.       Bicep reflexes are 3+ on the right side and 3+ on the left side.       Brachioradialis reflexes are 3+ on the right side and 3+ on the left side.       Patellar reflexes are 4+ on the right side and 4+ on the left side.       Achilles reflexes are 4+ on the right side and 4+ on the left side.  Psychiatric:         Speech: Speech normal.        Result Review :  The following data was reviewed by: Stefano Panda MD on 10/17/2024:  Common labs          2/8/2024    14:13   Common Labs   Glucose 82    BUN 14    Creatinine 0.59    Sodium 139    Potassium 3.9    Chloride 105    Calcium 9.6    Albumin 4.5    Total Bilirubin 0.2    Alkaline Phosphatase 55    AST (SGOT) 17    ALT (SGPT) 16    WBC 6.85    Hemoglobin 15.1    Hematocrit 45.4    Platelets 225                Assessment and Plan   Diagnoses and all orders for this visit:    1. Multiple sclerosis (Primary)  Assessment & Plan:  MRI B/C    CBC, CMP    Continue Vumerity    Orders:  -     MRI Brain With & Without Contrast; Future  -     MRI Cervical Spine With & Without Contrast; Future    2. Neuropathic pain  Assessment & Plan:  Stable on GBP, baclofen, Zanaflex     Orders:  -     gabapentin (NEURONTIN) 600 MG tablet; Take 1 tablet by mouth 3 (Three)  Times a Day.  Dispense: 270 tablet; Refill: 1    3. Gait abnormality  Assessment & Plan:  Stable on Ampyra     Orders:  -     Ambulatory Referral to Physical Therapy for Evaluation & Treatment    4. Spastic  -     baclofen (LIORESAL) 10 MG tablet; Take 1 tablet by mouth 3 (Three) Times a Day.  Dispense: 270 tablet; Refill: 3  -     tiZANidine (ZANAFLEX) 4 MG tablet; Take 2 tablets by mouth Every Night.  Dispense: 180 tablet; Refill: 3             Follow Up   No follow-ups on file.  Patient was given instructions and counseling regarding her condition or for health maintenance advice. Please see specific information pulled into the AVS if appropriate.

## 2024-10-28 ENCOUNTER — TELEPHONE (OUTPATIENT)
Dept: NEUROLOGY | Facility: CLINIC | Age: 44
End: 2024-10-28

## 2024-10-28 NOTE — TELEPHONE ENCOUNTER
Provider: DWIGHT    Caller: DEWAYNE    Phone Number: [phone]     Reason for Call:PT CALLED AND IS WANTING TO KNOW IF MRI ORDERS CAN BE [facility] OPEN [facility] IN [place] AS THIS IS CLOSER FOR PT.    [facility]  Address: [address], [place] [address]  Phone: [phone]    PLEASE REVIEW AND ADVISE  THANK YOU

## 2024-10-30 ENCOUNTER — TELEPHONE (OUTPATIENT)
Dept: NEUROLOGY | Facility: CLINIC | Age: 44
End: 2024-10-30
Payer: MEDICARE

## 2024-10-30 NOTE — TELEPHONE ENCOUNTER
Provider: DWIGHT    Caller: MIGUEL    Relationship to Patient: [facility]    Phone Number: [phone]    Reason for Call: MIGUEL FROM [facility] PHYSICAL THERAPY  STATED THEY ARE RECOMMENDING PATIENT TO GET AFO FOR DROP FOOT.

## 2024-11-04 ENCOUNTER — TELEPHONE (OUTPATIENT)
Dept: NEUROLOGY | Facility: CLINIC | Age: 44
End: 2024-11-04
Payer: MEDICARE

## 2024-11-04 NOTE — TELEPHONE ENCOUNTER
Spoke to patient to inform per Dr Panda that her MRI cervical has no concerning findings.  She expressed appreciation.

## 2024-11-04 NOTE — TELEPHONE ENCOUNTER
Spoke to patient to inform that her MRI was unchanged from the previous one.  She expressed appreciation.

## 2024-11-04 NOTE — TELEPHONE ENCOUNTER
----- Message from Stefano Panda sent at 11/4/2024 10:42 AM EST -----  Notify pt MRi cervical has no concerning findings

## 2024-11-04 NOTE — TELEPHONE ENCOUNTER
----- Message from Beryl Reynoso sent at 11/1/2024  4:33 PM EDT -----  Is let the patient know that MRI of the brain is unchanged.

## 2024-11-20 ENCOUNTER — SPECIALTY PHARMACY (OUTPATIENT)
Dept: ONCOLOGY | Facility: HOSPITAL | Age: 44
End: 2024-11-20
Payer: MEDICARE

## 2024-11-20 NOTE — PROGRESS NOTES
Specialty Pharmacy Refill Coordination Note     Daya is a 44 y.o. female contacted today regarding refills of  Vumerity and Dalfampridine specialty medication(s).    Reviewed and verified with patient:       Specialty medication(s) and dose(s) confirmed: yes    Refill Questions      Flowsheet Row Most Recent Value   Changes to allergies? No   Changes to medications? No   New conditions or infections since last clinic visit No   Unplanned office visit, urgent care, ED, or hospital admission in the last 4 weeks  No   How does patient/caregiver feel medication is working? Good   Financial problems or insurance changes  No   Since the previous refill, were any specialty medication doses or scheduled injections missed or delayed?  No   Does this patient require a clinical escalation to a pharmacist? No            Delivery Questions      Flowsheet Row Most Recent Value   Delivery method UPS   Delivery address verified with patient/caregiver? Yes   Delivery address Home   Number of medications in delivery 2   Medication(s) being filled and delivered Dalfampridine, Diroximel Fumarate (Vumerity)   Doses left of specialty medications Dalfampridine/Vumerity=little over a week   Copay verified? Yes   Copay amount 0$ for Vumerity and daflpramidrine   Copay form of payment No copayment ($0)   Ship Date 11/21   Delivery Date 11/22   Signature Required No              Medication Adherence    Adherence tools used: directed education  Support network for adherence: healthcare provider          Follow-up: 1 month(s)     Lashonda Feliciano, Pharmacy Technician  Specialty Pharmacy Technician

## 2024-12-06 ENCOUNTER — TELEPHONE (OUTPATIENT)
Dept: NEUROLOGY | Facility: CLINIC | Age: 44
End: 2024-12-06
Payer: MEDICARE

## 2024-12-06 NOTE — TELEPHONE ENCOUNTER
Provider: DR QUINTANILLA    Caller: [facility]    Phone Number: [phone]    FAX# [phone]    Reason for Call: NEED OV NOTES FAXED OVER, STATES THEY HAVE REQUESTED THEM BEFORE & HAVEN'T RECEIVED THEM. STATES THEY NEED TO MENTION WHY PATIENT NEEDS LEFT AFO & HOW IT WILL BE BENEFICIAL TO THEM. PLEASE REVIEW, THANK YOU.

## 2024-12-20 ENCOUNTER — SPECIALTY PHARMACY (OUTPATIENT)
Dept: ONCOLOGY | Facility: HOSPITAL | Age: 44
End: 2024-12-20
Payer: MEDICARE

## 2024-12-20 RX ORDER — DALFAMPRIDINE 10 MG/1
10 TABLET, FILM COATED, EXTENDED RELEASE ORAL 2 TIMES DAILY
Qty: 60 TABLET | Refills: 11 | Status: SHIPPED | OUTPATIENT
Start: 2024-12-20

## 2024-12-20 RX ORDER — DIROXIMEL FUMARATE 231 MG/1
462 CAPSULE ORAL 2 TIMES DAILY
Qty: 120 CAPSULE | Refills: 11 | Status: SHIPPED | OUTPATIENT
Start: 2024-12-20

## 2024-12-20 NOTE — PROGRESS NOTES
Specialty Pharmacy Refill Coordination Note     Daya is a 44 y.o. female contacted today regarding refills of  Vumerity and Dalfampridine specialty medication(s).    Reviewed and verified with patient:       Specialty medication(s) and dose(s) confirmed: yes    Refill Questions      Flowsheet Row Most Recent Value   Changes to allergies? No   Changes to medications? No   New conditions or infections since last clinic visit No   Unplanned office visit, urgent care, ED, or hospital admission in the last 4 weeks  No   How does patient/caregiver feel medication is working? Good   Financial problems or insurance changes  No   Since the previous refill, were any specialty medication doses or scheduled injections missed or delayed?  No   Does this patient require a clinical escalation to a pharmacist? No            Delivery Questions      Flowsheet Row Most Recent Value   Delivery method UPS   Delivery address verified with patient/caregiver? Yes   Delivery address Home   Number of medications in delivery 2   Medication(s) being filled and delivered Dalfampridine, Diroximel Fumarate (Vumerity)   Doses left of specialty medications Dalfampridine/Vumerity=little over a week   Copay verified? Yes   Copay amount 0$ for Vumerity and daflpramidrine   Copay form of payment No copayment ($0)   Ship Date 12/23   Delivery Date 12/24   Signature Required No              Medication Adherence    Adherence tools used: directed education  Support network for adherence: healthcare provider          Follow-up: 1 month(s)     Lashonda Feliciano, Pharmacy Technician  Specialty Pharmacy Technician

## 2025-01-22 ENCOUNTER — SPECIALTY PHARMACY (OUTPATIENT)
Dept: ONCOLOGY | Facility: HOSPITAL | Age: 45
End: 2025-01-22
Payer: MEDICARE

## 2025-01-22 NOTE — PROGRESS NOTES
Specialty Pharmacy Refill Coordination Note     aDya is a 44 y.o. female contacted today regarding refills of  Vumerity and Dalfampridine specialty medication(s).    Reviewed and verified with patient:       Specialty medication(s) and dose(s) confirmed: yes    Refill Questions      Flowsheet Row Most Recent Value   Changes to allergies? No   Changes to medications? No   New conditions or infections since last clinic visit No   Unplanned office visit, urgent care, ED, or hospital admission in the last 4 weeks  No   How does patient/caregiver feel medication is working? Good   Financial problems or insurance changes  No   Since the previous refill, were any specialty medication doses or scheduled injections missed or delayed?  No   Does this patient require a clinical escalation to a pharmacist? No            Delivery Questions      Flowsheet Row Most Recent Value   Delivery method UPS   Delivery address verified with patient/caregiver? Yes   Delivery address Home   Number of medications in delivery 2   Medication(s) being filled and delivered Dalfampridine, Diroximel Fumarate (Vumerity)   Doses left of specialty medications Dalfampridine/Vumerity=about a week   Copay verified? Yes   Copay amount 0$ for Vumerity and daflpramidrine   Copay form of payment No copayment ($0)   Ship Date 1/23   Delivery Date Selection 01/24/25   Signature Required No              Medication Adherence    Adherence tools used: directed education  Support network for adherence: healthcare provider          Follow-up: 1 month(s)     Lashonda Feliciano, Pharmacy Technician  Specialty Pharmacy Technician

## 2025-02-12 DIAGNOSIS — R25.2 SPASTIC: Chronic | ICD-10-CM

## 2025-02-12 RX ORDER — BACLOFEN 10 MG/1
10 TABLET ORAL 3 TIMES DAILY
Qty: 270 TABLET | Refills: 3 | Status: SHIPPED | OUTPATIENT
Start: 2025-02-12

## 2025-02-12 NOTE — TELEPHONE ENCOUNTER
Rx Refill Note  Requested Prescriptions     Pending Prescriptions Disp Refills    baclofen (LIORESAL) 10 MG tablet [Pharmacy Med Name: Baclofen Oral Tablet 10 MG] 270 tablet 3     Sig: TAKE 1 TABLET THREE TIMES DAILY      Last filled:  10/17/24 w/3  Last office visit with prescribing clinician: 10/17/2024      Next office visit with prescribing clinician: 4/17/2025     Marcelina Anderson MA  02/12/25, 08:39 EST

## 2025-02-19 ENCOUNTER — SPECIALTY PHARMACY (OUTPATIENT)
Dept: ONCOLOGY | Facility: HOSPITAL | Age: 45
End: 2025-02-19
Payer: MEDICARE

## 2025-02-19 NOTE — PROGRESS NOTES
Specialty Pharmacy Patient Management Program  Refill Outreach     Daya was contacted today regarding refills of their medication(s).    Refill Questions      Flowsheet Row Most Recent Value   Changes to allergies? No   Changes to medications? No   New conditions or infections since last clinic visit No   Unplanned office visit, urgent care, ED, or hospital admission in the last 4 weeks  No   How does patient/caregiver feel medication is working? Good   Financial problems or insurance changes  No   Since the previous refill, were any specialty medication doses or scheduled injections missed or delayed?  No   Does this patient require a clinical escalation to a pharmacist? No            Delivery Questions      Flowsheet Row Most Recent Value   Delivery method UPS   Delivery address verified with patient/caregiver? Yes   Delivery address Home   Number of medications in delivery 2   Medication(s) being filled and delivered Diroximel Fumarate (Vumerity), Dalfampridine   Doses left of specialty medications Dalfampridine 5 days left and Vumerity 5 days left   Copay verified? Yes   Copay amount Dalfampridine and Vumerity =$0   Copay form of payment No copayment ($0)   Delivery Date Selection 02/20/25   Signature Required Yes            Medication Adherence    Adherence tools used: directed education  Support network for adherence: healthcare provider          Follow-up: 30 day(s)     Ynes Prado  2/19/2025  12:16 EST

## 2025-03-20 ENCOUNTER — SPECIALTY PHARMACY (OUTPATIENT)
Dept: ONCOLOGY | Facility: HOSPITAL | Age: 45
End: 2025-03-20
Payer: MEDICARE

## 2025-03-20 NOTE — PROGRESS NOTES
Specialty Pharmacy Patient Management Program  Refill Outreach     Daya was contacted today regarding refills of their medication(s).    Refill Questions      Flowsheet Row Most Recent Value   Changes to allergies? No   Changes to medications? No   New conditions or infections since last clinic visit No   Unplanned office visit, urgent care, ED, or hospital admission in the last 4 weeks  No   How does patient/caregiver feel medication is working? Good   Financial problems or insurance changes  No   Since the previous refill, were any specialty medication doses or scheduled injections missed or delayed?  No   Does this patient require a clinical escalation to a pharmacist? No            Delivery Questions      Flowsheet Row Most Recent Value   Delivery method UPS   Delivery address verified with patient/caregiver? Yes   Delivery address Home   Number of medications in delivery 2   Medication(s) being filled and delivered Diroximel Fumarate (Vumerity), Dalfampridine   Doses left of specialty medications Dalfampridine a week left and Vumerity 5 days left   Copay verified? Yes   Copay amount Dalfamprine and Vumerity =$0   Copay form of payment No copayment ($0)   Delivery Date Selection 03/25/25   Signature Required No            Medication Adherence    Adherence tools used: directed education  Support network for adherence: healthcare provider          Follow-up: 30 day(s)     Ynes Prdao  3/20/2025  14:57 EDT

## 2025-04-17 ENCOUNTER — SPECIALTY PHARMACY (OUTPATIENT)
Dept: ONCOLOGY | Facility: HOSPITAL | Age: 45
End: 2025-04-17
Payer: MEDICARE

## 2025-04-17 DIAGNOSIS — R26.9 GAIT ABNORMALITY: Chronic | ICD-10-CM

## 2025-04-17 DIAGNOSIS — G35 MULTIPLE SCLEROSIS: Primary | Chronic | ICD-10-CM

## 2025-04-17 NOTE — PROGRESS NOTES
Specialty Pharmacy Refill Coordination Note     Daya is a 44 y.o. female contacted today regarding refills of her specialty medication(s).    Specialty medication(s) and dose(s) confirmed: vumerity 231 mg, dalfampridine 10 mg  Changes to medications: no  Changes to insurance: no  Reviewed and verified with patient:  Allergies  Meds  Problems         Refill Questions      Flowsheet Row Most Recent Value   Changes to allergies? No   Changes to medications? No   New conditions or infections since last clinic visit No   Unplanned office visit, urgent care, ED, or hospital admission in the last 4 weeks  No   How does patient/caregiver feel medication is working? Good   Financial problems or insurance changes  No   Since the previous refill, were any specialty medication doses or scheduled injections missed or delayed?  No   Does this patient require a clinical escalation to a pharmacist? No            Delivery Questions      Flowsheet Row Most Recent Value   Delivery method UPS   Delivery address verified with patient/caregiver? Yes   Delivery address Home   Number of medications in delivery 2   Medication(s) being filled and delivered Diroximel Fumarate (Vumerity), Dalfampridine   Doses left of specialty medications several   Copay verified? Yes   Copay form of payment No copayment ($0)   Delivery Date Selection 04/22/25   Signature Required No   Do you consent to receive electronic handouts?  No            Medication Adherence    Adherence tools used: directed education  Support network for adherence: healthcare provider          Follow-up: 3 month(s)     Barney Chand, PharmD  4/17/2025   11:15 EDT

## 2025-04-17 NOTE — PROGRESS NOTES
Specialty Pharmacy Patient Management Program  Neurology Reassessment     Daya Ashford is a 44 y.o. female with multiple sclerosis seen by a Neurology provider and enrolled in the Neurology Patient Management program offered by [facility].  A follow-up outreach was conducted, including assessment of continued therapy appropriateness, medication adherence, and side effect incidence and management for dalfampridine 10 mg po bid, diroximel fumarate 231 mg 2 caps bid.     Changes to Insurance Coverage or Financial Support  No changes.     Relevant Past Medical History and Comorbidities  Relevant medical history and concomitant health conditions were discussed with the patient. The patient's chart has been reviewed for relevant past medical history and comorbid health conditions and updated as necessary.   Past Medical History:   Diagnosis Date    MS (multiple sclerosis)      Social History     Socioeconomic History    Marital status:    Tobacco Use    Smoking status: Every Day     Current packs/day: 0.50     Average packs/day: 0.5 packs/day for 15.0 years (7.5 ttl pk-yrs)     Types: Cigarettes     Passive exposure: Current    Smokeless tobacco: Never   Vaping Use    Vaping status: Never Used   Substance and Sexual Activity    Alcohol use: No    Drug use: Never    Sexual activity: Yes     Partners: Male     Birth control/protection: None     Problem list reviewed by Barney Chand, PharmD on 4/17/2025 at 10:53 AM    Hospitalizations and Urgent Care Since Last Assessment  ED Visits, Admissions, or Hospitalizations: none   Urgent Office Visits: none     Allergies  Known allergies and reactions were discussed with the patient. The patient's chart has been reviewed for allergy information and updated as necessary.   No Known Allergies  Allergies reviewed by Barney Chand, PharmD on 4/17/2025 at 10:26 AM    Relevant Laboratory Values  Common labs          10/17/2024    10:44    Common Labs   Glucose 87    BUN 10    Creatinine 0.73    Sodium 139    Potassium 4.3    Chloride 101    Calcium 10.0    Albumin 4.5    Total Bilirubin 0.3    Alkaline Phosphatase 58    AST (SGOT) 14    ALT (SGPT) 14    WBC 7.92    Hemoglobin 15.4    Hematocrit 47.0    Platelets 215        Lab Assessment   The above labs have been reviewed. No dose adjustments are needed for the specialty medication(s) based on the labs. She is due for labs. We will mail out lab orders for her to take to a local lab/hospital.    Current Medication List  This medication list has been reviewed with the patient and evaluated for any interactions or necessary modifications/recommendations, and updated to include all prescription medications, OTC medications, and supplements the patient is currently taking.  This list reflects what is contained in the patient's profile, which has also been marked as reviewed to communicate to other providers it is the most up to date version of the patient's current medication therapy.     Current Outpatient Medications:     baclofen (LIORESAL) 10 MG tablet, TAKE 1 TABLET THREE TIMES DAILY, Disp: 270 tablet, Rfl: 3    cyclobenzaprine (FLEXERIL) 10 MG tablet, TAKE 1 TABLET TWICE DAILY AS NEEDED FOR MUSCLE SPASM(S), Disp: 180 tablet, Rfl: 3    Dalfampridine ER 10 MG tablet sustained-release 12 hour, Take 1 tablet by mouth 2 (Two) Times a Day., Disp: 60 tablet, Rfl: 11    Diroximel Fumarate (Vumerity) 231 MG capsule delayed-release, Take 2 capsules by mouth 2 (Two) Times a Day., Disp: 120 capsule, Rfl: 11    famotidine (PEPCID) 20 MG tablet, TAKE 1 TABLET TWICE DAILY, Disp: 180 tablet, Rfl: 3    gabapentin (NEURONTIN) 600 MG tablet, Take 1 tablet by mouth 3 (Three) Times a Day., Disp: 270 tablet, Rfl: 1    tiZANidine (ZANAFLEX) 4 MG tablet, Take 2 tablets by mouth Every Night., Disp: 180 tablet, Rfl: 3    Medicines reviewed by Barney Chand, PharmD on 4/17/2025 at 10:28 AM    Drug Interactions  No  relevant drug drug interactions with specialty medication.       Adverse Drug Reactions  Medication tolerability: Tolerating with no to minimal ADRs          Medication plan: Continue therapy with normal follow-up  Plan for ADR Management: patient to report      Adherence, Self-Administration, and Current Therapy Problems  Adherence related patient's specialty therapy was discussed with the patient. The Adherence segment of this outreach has been reviewed and updated.   Adherence Questions  Linked Medication(s) Assessed: Dalfampridine, Diroximel Fumarate (Vumerity)  On average, how many doses/injections does the patient miss per month?: 0  What are the identified reasons for non-adherence or missed doses? : no problems identified  What is the estimated medication adherence level?: %  Based on the patient/caregiver response and refill history, does this patient require an MTP to track adherence improvements?: no    Additional Barriers to Patient Self-Administration: none  Methods for Supporting Patient Self-Administration: n/a    Recently Close Medication Therapy Problems  No medication therapy recommendations to display  Open Medication Therapy Problems  No medication therapy recommendations to display     Goals of Therapy  Goals related to the patient's specialty therapy was discussed with the patient. The Patient Goals segment of this outreach has been reviewed and updated.    Goals Addressed Today        Specialty Pharmacy General Goal      Vumerity:  Reduce the number of relapses, delay progression of disability, and limit new disease activity (as seen on MRI).  Dalfampfidine: Improved  Timed 25-Foot Walk (T25-FW) of 9.69 on 3/20/23  10/17/24 - walking speed/gait stable, no signs/sx of relapse, and not changes seen on MRIs on 2/20/23 compared with the prior year.                   Quality of Life Assessment   Quality of Life related to the patient's enrollment in the patient management program and services  provided was discussed with the patient. The QOL segment of this outreach has been reviewed and updated.   Quality of Life Improvement Scale: 8-Moderately better    Reassessment Plan & Follow-Up  Medication Therapy Changes: n/a  Related Plans, Therapy Recommendations, or Issues to Be Addressed: going to lab soon for 6 month recheck.  Pharmacist to perform regular reassessments no more than (6) months from the previous assessment.  Care Coordinator to set up future refill outreaches, coordinate prescription delivery, and escalate clinical questions to pharmacist.     Attestation  Therapeutic appropriateness: Appropriate  I attest the patient was actively involved in and has agreed to the above plan of care. If the prescribed therapy is at any point deemed not appropriate based on the current or future assessments, a consultation will be initiated with the patient's specialty care provider to determine the best course of action. The revised plan of therapy will be documented along with any additional patient education provided. Discussed aforementioned material with patient by phone.    Barney Chand, PharmD, [facility]  Clinic Specialty Pharmacist, Neurology  4/17/2025  10:55 EDT

## 2025-05-09 ENCOUNTER — SPECIALTY PHARMACY (OUTPATIENT)
Dept: ONCOLOGY | Facility: HOSPITAL | Age: 45
End: 2025-05-09
Payer: MEDICARE

## 2025-05-09 NOTE — PROGRESS NOTES
Specialty Pharmacy Patient Management Program  Refill Outreach     Daya was contacted today regarding refills of their medication(s).    Refill Questions      Flowsheet Row Most Recent Value   Changes to allergies? No   Changes to medications? Yes  [05/09 patient stating Metronidazole 500mg 2 times daily and Ciprofloxacin 500mg 2 times daily for UTI.]   Unplanned office visit, urgent care, ED, or hospital admission in the last 4 weeks  Yes  [05/09 patient stating Metronidazole 500mg 2 times daily and Ciprofloxacin 500mg 2 times daily for UTI.]   How does patient/caregiver feel medication is working? Good   Financial problems or insurance changes  No   Since the previous refill, were any specialty medication doses or scheduled injections missed or delayed?  No   Does this patient require a clinical escalation to a pharmacist? Yes  [05/09 patient stating Metronidazole 500mg 2 times daily and Ciprofloxacin 500mg 2 times daily for UTI.]            Delivery Questions      Flowsheet Row Most Recent Value   Delivery method UPS   Delivery address verified with patient/caregiver? Yes   Delivery address Home   Other address preferred N/A   Number of medications in delivery 2   Medication(s) being filled and delivered Diroximel Fumarate (Vumerity), Dalfampridine   Doses left of specialty medications Dalampridine and Vumerity for a week   Copay verified? Yes   Copay amount Dalampridine and Vumerity =$0   Copay form of payment No copayment ($0)   Delivery Date Selection 05/15/25   Signature Required No   Do you consent to receive electronic handouts?  No            Medication Adherence    Adherence tools used: directed education  Support network for adherence: healthcare provider          Follow-up: 30 day(s)     Ynes Prado  5/9/2025  12:22 EDT

## 2025-05-12 RX ORDER — METRONIDAZOLE 500 MG/1
500 TABLET ORAL 2 TIMES DAILY
COMMUNITY

## 2025-05-13 DIAGNOSIS — M79.2 NEUROPATHIC PAIN: Chronic | ICD-10-CM

## 2025-05-14 NOTE — TELEPHONE ENCOUNTER
Rx Refill Note  Requested Prescriptions     Pending Prescriptions Disp Refills    gabapentin (NEURONTIN) 600 MG tablet [Pharmacy Med Name: GABAPENTIN 600MG    TAB] 270 tablet 0     Sig: TAKE 1 TABLET BY MOUTH THREE TIMES DAILY      Last filled: 10/17/24 270 with 1 refill.  Last office visit with prescribing clinician: 10/17/2024      Next office visit with prescribing clinician: 7/18/2025         Shellie Sabillon MA  05/14/25, 15:28 EDT

## 2025-05-15 RX ORDER — GABAPENTIN 600 MG/1
600 TABLET ORAL 3 TIMES DAILY
Qty: 270 TABLET | Refills: 1 | Status: SHIPPED | OUTPATIENT
Start: 2025-05-15

## 2025-05-18 DIAGNOSIS — G35 MULTIPLE SCLEROSIS: ICD-10-CM

## 2025-05-19 RX ORDER — FAMOTIDINE 20 MG/1
20 TABLET, FILM COATED ORAL 2 TIMES DAILY
Qty: 180 TABLET | Refills: 3 | Status: SHIPPED | OUTPATIENT
Start: 2025-05-19

## 2025-05-28 DIAGNOSIS — R25.2 SPASTIC: Chronic | ICD-10-CM

## 2025-05-28 NOTE — TELEPHONE ENCOUNTER
Rx Refill Note  Requested Prescriptions     Pending Prescriptions Disp Refills    tiZANidine (ZANAFLEX) 4 MG tablet [Pharmacy Med Name: tiZANidine HCl Oral Tablet 4 MG] 180 tablet 3     Sig: TAKE 2 TABLETS EVERY NIGHT AT BEDTIME      Last filled:  10/17/24 w/3  Last office visit with prescribing clinician: 10/17/2024      Next office visit with prescribing clinician: 7/18/2025     Marcelina Anderson MA  05/28/25, 08:02 EDT

## 2025-06-23 ENCOUNTER — SPECIALTY PHARMACY (OUTPATIENT)
Dept: ONCOLOGY | Facility: HOSPITAL | Age: 45
End: 2025-06-23
Payer: MEDICARE

## 2025-06-23 NOTE — PROGRESS NOTES
Specialty Pharmacy Patient Management Program  Refill Outreach     Daya was contacted today regarding refills of their medication(s).    Refill Questions      Flowsheet Row Most Recent Value   Changes to allergies? No   Changes to medications? No   New conditions or infections since last clinic visit No   Unplanned office visit, urgent care, ED, or hospital admission in the last 4 weeks  No   How does patient/caregiver feel medication is working? Good   Financial problems or insurance changes  No   Since the previous refill, were any specialty medication doses or scheduled injections missed or delayed?  No   Does this patient require a clinical escalation to a pharmacist? No            Delivery Questions      Flowsheet Row Most Recent Value   Delivery method UPS   Delivery address verified with patient/caregiver? Yes   Delivery address Home   Other address preferred N/A   Number of medications in delivery 2   Medication(s) being filled and delivered Diroximel Fumarate (Vumerity), Dalfampridine   Doses left of specialty medications N/A   Copay verified? Yes   Copay amount Dlafampridine and Vumerity =$0   Copay form of payment No copayment ($0)   Delivery Date Selection 06/25/25   Signature Required No   Do you consent to receive electronic handouts?  Yes            Medication Adherence    Adherence tools used: directed education  Support network for adherence: healthcare provider          Follow-up: 30 day(s)     Ynes Prado  6/23/2025  15:07 EDT

## 2025-07-17 ENCOUNTER — TELEPHONE (OUTPATIENT)
Dept: NEUROLOGY | Facility: CLINIC | Age: 45
End: 2025-07-17
Payer: MEDICARE

## 2025-07-17 ENCOUNTER — SPECIALTY PHARMACY (OUTPATIENT)
Dept: ONCOLOGY | Facility: HOSPITAL | Age: 45
End: 2025-07-17
Payer: MEDICARE

## 2025-07-17 DIAGNOSIS — G35 MULTIPLE SCLEROSIS: Primary | ICD-10-CM

## 2025-07-17 NOTE — PROGRESS NOTES
Specialty Pharmacy Patient Management Program  Refill Outreach     Daya was contacted today regarding refills of their medication(s).    Refill Questions      Flowsheet Row Most Recent Value   Changes to allergies? No   Changes to medications? No   New conditions or infections since last clinic visit No   Unplanned office visit, urgent care, ED, or hospital admission in the last 4 weeks  No   How does patient/caregiver feel medication is working? Good   Financial problems or insurance changes  No   Since the previous refill, were any specialty medication doses or scheduled injections missed or delayed?  No   Does this patient require a clinical escalation to a pharmacist? No            Delivery Questions      Flowsheet Row Most Recent Value   Delivery method UPS   Delivery address verified with patient/caregiver? Yes   Delivery address Home   Other address preferred N/A   Number of medications in delivery 2   Medication(s) being filled and delivered Diroximel Fumarate (Vumerity), Dalfampridine   Doses left of specialty medications Dalfampridine and Dixomel Fumarate 5 days left   Copay verified? Yes   Copay amount Dalfampridine and Dixomel Fumarate =$0   Copay form of payment No copayment ($0)   Delivery Date Selection 07/19/25   Signature Required No   Do you consent to receive electronic handouts?  Yes            Medication Adherence    Adherence tools used: directed education  Support network for adherence: healthcare provider          Follow-up: 30 day(s)     Ynes Prado  7/17/2025  08:40 EDT

## 2025-07-17 NOTE — TELEPHONE ENCOUNTER
Provider: DWIGHT    Caller: Daya Ashford    Relationship to Patient: Self         Phone Number:  [phone]    Reason for Call: PATIENT CALLING TO ADVISE, ANY BLOOD WORK NEEDED SHE WOULD LIKE ORDER SENT TO HER SO SHE CAN COMPLETE CLOSE TO HOME.

## 2025-07-31 RX ORDER — CYCLOBENZAPRINE HCL 10 MG
10 TABLET ORAL 2 TIMES DAILY PRN
Qty: 180 TABLET | Refills: 0 | Status: SHIPPED | OUTPATIENT
Start: 2025-07-31

## 2025-07-31 NOTE — TELEPHONE ENCOUNTER
Rx Refill Note  Requested Prescriptions     Pending Prescriptions Disp Refills    cyclobenzaprine (FLEXERIL) 10 MG tablet [Pharmacy Med Name: CYCLOBENZAPRINE HYDROCHLORIDE 10 MG Oral Tablet] 180 tablet 3     Sig: TAKE 1 TABLET TWICE DAILY AS NEEDED FOR MUSCLE SPASM(S)      Last filled:  10/11/24 w/3  Last office visit with prescribing clinician: 10/17/2024      Next office visit with prescribing clinician: 10/15/2025     Marcelina Anderson MA  07/31/25, 12:32 EDT

## 2025-08-14 ENCOUNTER — SPECIALTY PHARMACY (OUTPATIENT)
Facility: HOSPITAL | Age: 45
End: 2025-08-14
Payer: MEDICARE